# Patient Record
Sex: FEMALE | Race: BLACK OR AFRICAN AMERICAN | NOT HISPANIC OR LATINO | URBAN - METROPOLITAN AREA
[De-identification: names, ages, dates, MRNs, and addresses within clinical notes are randomized per-mention and may not be internally consistent; named-entity substitution may affect disease eponyms.]

---

## 2021-10-28 ENCOUNTER — EMERGENCY (EMERGENCY)
Facility: HOSPITAL | Age: 31
LOS: 0 days | Discharge: HOME | End: 2021-10-28
Attending: STUDENT IN AN ORGANIZED HEALTH CARE EDUCATION/TRAINING PROGRAM
Payer: COMMERCIAL

## 2021-10-28 VITALS
DIASTOLIC BLOOD PRESSURE: 86 MMHG | WEIGHT: 149.91 LBS | OXYGEN SATURATION: 100 % | RESPIRATION RATE: 17 BRPM | SYSTOLIC BLOOD PRESSURE: 139 MMHG | HEIGHT: 59 IN | TEMPERATURE: 98 F | HEART RATE: 77 BPM

## 2021-10-28 VITALS
TEMPERATURE: 98 F | OXYGEN SATURATION: 98 % | HEART RATE: 80 BPM | DIASTOLIC BLOOD PRESSURE: 87 MMHG | SYSTOLIC BLOOD PRESSURE: 119 MMHG | RESPIRATION RATE: 15 BRPM

## 2021-10-28 DIAGNOSIS — M54.2 CERVICALGIA: ICD-10-CM

## 2021-10-28 DIAGNOSIS — M43.6 TORTICOLLIS: ICD-10-CM

## 2021-10-28 PROCEDURE — 93010 ELECTROCARDIOGRAM REPORT: CPT

## 2021-10-28 PROCEDURE — 99284 EMERGENCY DEPT VISIT MOD MDM: CPT

## 2021-10-28 RX ORDER — IBUPROFEN 200 MG
600 TABLET ORAL ONCE
Refills: 0 | Status: COMPLETED | OUTPATIENT
Start: 2021-10-28 | End: 2021-10-28

## 2021-10-28 RX ORDER — METHOCARBAMOL 500 MG/1
750 TABLET, FILM COATED ORAL ONCE
Refills: 0 | Status: COMPLETED | OUTPATIENT
Start: 2021-10-28 | End: 2021-10-28

## 2021-10-28 RX ORDER — LIDOCAINE 4 G/100G
1 CREAM TOPICAL ONCE
Refills: 0 | Status: COMPLETED | OUTPATIENT
Start: 2021-10-28 | End: 2021-10-28

## 2021-10-28 RX ORDER — DIAZEPAM 5 MG
2 TABLET ORAL ONCE
Refills: 0 | Status: DISCONTINUED | OUTPATIENT
Start: 2021-10-28 | End: 2021-10-28

## 2021-10-28 RX ORDER — ACETAMINOPHEN 500 MG
975 TABLET ORAL ONCE
Refills: 0 | Status: COMPLETED | OUTPATIENT
Start: 2021-10-28 | End: 2021-10-28

## 2021-10-28 RX ORDER — CYCLOBENZAPRINE HYDROCHLORIDE 10 MG/1
1 TABLET, FILM COATED ORAL
Qty: 18 | Refills: 0
Start: 2021-10-28 | End: 2021-10-30

## 2021-10-28 RX ADMIN — Medication 2 MILLIGRAM(S): at 05:53

## 2021-10-28 RX ADMIN — LIDOCAINE 1 PATCH: 4 CREAM TOPICAL at 05:53

## 2021-10-28 RX ADMIN — Medication 975 MILLIGRAM(S): at 05:52

## 2021-10-28 RX ADMIN — METHOCARBAMOL 750 MILLIGRAM(S): 500 TABLET, FILM COATED ORAL at 05:53

## 2021-10-28 RX ADMIN — Medication 600 MILLIGRAM(S): at 05:53

## 2021-10-28 NOTE — ED PROVIDER NOTE - PATIENT PORTAL LINK FT
You can access the FollowMyHealth Patient Portal offered by [facility] [place] by registering at the following website: [email or web address]. By joining [facility]’s FollowMyHealth portal, you will also be able to view your health information using other applications (apps) compatible with our system.

## 2021-10-28 NOTE — ED PROVIDER NOTE - OBJECTIVE STATEMENT
31F no pmhx presents to ED for 2-3 days of L neck pain radiating into her L shoulder. States she thinks she may have "slept on it wrong". Reports the pain is 7/10, worse with head turning / movement. Denies sob, chest pain, cough, fevers, dizziness.

## 2021-10-28 NOTE — ED PROVIDER NOTE - NSFOLLOWUPINSTRUCTIONS_ED_ALL_ED_FT
1) Please follow-up with your primary care doctor within the next 3 days.  If you cannot follow-up with your doctor(s), please return to the ED for any urgent issues.  2) If you have any worsening of symptoms or any other concerns please return to the ED immediately.  3) Please continue taking your home medications as directed.  4) You may have been given a copy of your labs and/or imaging.  Please go over these with your primary care doctor.  5) Take 600mg Motrin (also called Advil or Ibuprofen) every 6 hours as needed for fever/pain/discomfort/swelling. You can take this with Tylenol 650 mg (also called acetaminophen) every 6 hours.   Don't use more than 3500mg of Tylenol in any 24-hour period. Make sure your other prescription/over-the-counter medications don't contain any Tylenol so you don't take too much.   If you have any stomach discomfort while taking Motrin, you can use TUMS or Pepcid or Zantac (these can all be bought without a prescription).

## 2021-10-28 NOTE — ED ADULT TRIAGE NOTE - ESI TRIAGE ACUITY LEVEL, MLM
Called pt to confirm he was able to refill all of this medications.  [facility] and all other medications are being delivered by  Specialty pharmacy today per pt's wife Shivani.  BG has been in the high 100's to low 200's per pt.  Pt states he is wearing his lymphedema wraps everyday.    Pt refused [facility] when they called to set up appointment.  Will update MD.  
3

## 2021-10-28 NOTE — ED PROVIDER NOTE - PHYSICAL EXAMINATION
Constitutional: Well appearing, awake, alert, oriented to person, place, time/situation and in no apparent distress.  ENMT: Airway patent.  Eyes: Clear bilaterally, pupils equal, round and reactive to light.  Cardiac: Normal rate, regular rhythm.  Heart sounds S1, S2.  Respiratory: Breath sounds clear and equal bilaterally.  Gastrointestinal: Abdomen soft, non-tender, no guarding.  Neurological: Alert and oriented, no focal deficits, no motor or sensory deficits.  Skin: No evidence of rash.   MSK: pain to palpation of L upper trapezius

## 2021-10-28 NOTE — ED PROVIDER NOTE - CLINICAL SUMMARY MEDICAL DECISION MAKING FREE TEXT BOX
pain seems very MSK. Pt with obvious neck stiffness, pain worsened with neck movement. Will trial pain meds, muscle relaxant, and reassess.

## 2021-10-28 NOTE — ED ADULT NURSE NOTE - OBJECTIVE STATEMENT
Pt alert and oriented present to ed with c/o left neck, shoulder and chest pain and lower back pain x 3 days worse with movement. Pt denies any sob, jaw pain, fever, chills, N/V/D.

## 2022-10-03 ENCOUNTER — ASOB RESULT (OUTPATIENT)
Age: 32
End: 2022-10-03

## 2022-10-03 ENCOUNTER — APPOINTMENT (OUTPATIENT)
Dept: OBGYN | Facility: CLINIC | Age: 32
End: 2022-10-03

## 2022-10-03 VITALS
DIASTOLIC BLOOD PRESSURE: 88 MMHG | HEIGHT: 59 IN | WEIGHT: 150 LBS | SYSTOLIC BLOOD PRESSURE: 132 MMHG | BODY MASS INDEX: 30.24 KG/M2

## 2022-10-03 DIAGNOSIS — N91.2 AMENORRHEA, UNSPECIFIED: ICD-10-CM

## 2022-10-03 DIAGNOSIS — N39.0 URINARY TRACT INFECTION, SITE NOT SPECIFIED: ICD-10-CM

## 2022-10-03 PROCEDURE — 99203 OFFICE O/P NEW LOW 30 MIN: CPT

## 2022-10-03 PROCEDURE — 36415 COLL VENOUS BLD VENIPUNCTURE: CPT

## 2022-10-03 PROCEDURE — 76817 TRANSVAGINAL US OBSTETRIC: CPT

## 2022-10-03 PROCEDURE — 76801 OB US < 14 WKS SINGLE FETUS: CPT

## 2022-10-03 NOTE — PROCEDURE
[Transvaginal OB Sonogram] : Transvaginal OB Sonogram [Intrauterine Pregnancy] : intrauterine pregnancy [Yolk Sac] : yolk sac present [Fetal Heart] : fetal heart present [Date: ___] : Date: [unfilled] [Current GA by Sonogram: ___ (wks)] : Current GA by Sonogram: [unfilled]Uwks [___ day(s)] : [unfilled] days [Transvaginal OB Sonogram WNL] : Transvaginal OB Sonogram WNL [FreeTextEntry1] : 7w5d (gives JOHN 5/17/2023_\par -normal ovaries

## 2022-10-03 NOTE — HISTORY OF PRESENT ILLNESS
[FreeTextEntry1] : 31 y/o  LMP 22 female presents as new patient for amenorrhea visit. Pt had been trying for a while to conceive, highly desired pregnancy. Pt reports irregular menses. Initially menses only 4-5 times a year but recently has had more menses yearly but still irregular. reports bleeding lasting 7 days.  . \par Pt reports nausea, but keeping food down. \par \par GYN: no Gardasil vaccine, hx abnormal pap HPV+ (), h/o HPV s/p colpo and biopsy, no treatment given. \par          had recent pap with Gyn last week-- awaiting results\par PMH/PSH: denies\par Family history: HTN (mother), Colon cancer (MGM recently ) \par Social: Pt works for [employer] from home,  social alcohol, marijuana (stopped with +hpt)\par NKDA\par Meds: pnv\par \par Covid vaccine x2\par Accepts blood products\par no Latex allergies\par no h/o VTE\par

## 2022-10-03 NOTE — END OF VISIT
[FreeTextEntry3] : I, Janie Ramires, acted as a scribe on behalf of Dr. Kyra Whiteside on 10/03/2022 \par \par All medical entries made by the scribe were at my, Dr. Kyra Whiteside, direction and personally dictated by me on 10/03/2022 . I have reviewed the chart and agree that the record accurately reflects my personal performance of the history, physical exam, assessment and plan. I have also personally directed, reviewed, and agreed with the chart\par

## 2022-10-03 NOTE — PLAN
[FreeTextEntry1] : 33 y/o  LMP 22 female presents as new patient for amenorrhea visit. \par \par -TVUS today shows early IUP measuring 7w5d, LMP of 22; Approx JOHN of 23 ;\par -irregular menses, will use sono dating \par - Early pregnancy precautions, practice /hospital info and folder provided to patient. \par -NOB blood panel, GCC cx, UCX, collected and sent at todays office visit. \par -most recent pap last week from outside gyn- awaiting pap results\par -Cont PNVs\par \par First pregnancy\par - advised to start bASA, 81 mg daily, (81mg) starting @ 12 weeks discussed \par -cont pnv\par \par Genetics\par -SEMA4 expanded carrier screen drawn and sent at today's visit \par \par Nausea\par -small frequent meals advised \par -discussed Diclegis use if needed; refused, pt able to tolerate foods and fluids at this time\par \par Immunizations\par -advised flu vaccine \par -advised Covid booster  \par \par RTO 4-5 weeks NOB, NT sono, NIPS \par RTO 8-9 weeks for AFP

## 2022-10-03 NOTE — PHYSICAL EXAM
[Appropriately responsive] : appropriately responsive [Alert] : alert [No Acute Distress] : no acute distress [Clear to Auscultation B/L] : clear to auscultation bilaterally [Soft] : soft [Non-tender] : non-tender [Non-distended] : non-distended [No HSM] : No HSM [No Lesions] : no lesions [No Mass] : no mass [Oriented x3] : oriented x3 [Examination Of The Breasts] : a normal appearance [No Masses] : no breast masses were palpable [Labia Majora] : normal [Labia Minora] : normal [Normal] : normal [Uterine Adnexae] : normal [Enlarged ___ wks] : enlarged [unfilled] ~Uweeks

## 2022-10-06 LAB
25(OH)D3 SERPL-MCNC: 18.5 NG/ML
ABO + RH PNL BLD: NORMAL
BACTERIA UR CULT: NORMAL
BASOPHILS # BLD AUTO: 0.05 K/UL
BASOPHILS NFR BLD AUTO: 0.5 %
BLD GP AB SCN SERPL QL: NORMAL
C TRACH RRNA SPEC QL NAA+PROBE: NOT DETECTED
EOSINOPHIL # BLD AUTO: 0.09 K/UL
EOSINOPHIL NFR BLD AUTO: 1 %
ESTIMATED AVERAGE GLUCOSE: 108 MG/DL
HBA1C MFR BLD HPLC: 5.4 %
HBV SURFACE AG SER QL: NONREACTIVE
HCT VFR BLD CALC: 40 %
HCV AB SER QL: NONREACTIVE
HCV S/CO RATIO: 0.1 S/CO
HGB A MFR BLD: 97.7 %
HGB A2 MFR BLD: 2.3 %
HGB BLD-MCNC: 12.5 G/DL
HGB FRACT BLD-IMP: NORMAL
HIV1+2 AB SPEC QL IA.RAPID: NONREACTIVE
IMM GRANULOCYTES NFR BLD AUTO: 0.3 %
LEAD BLD-MCNC: <1 UG/DL
LYMPHOCYTES # BLD AUTO: 2.26 K/UL
LYMPHOCYTES NFR BLD AUTO: 24.5 %
MAN DIFF?: NORMAL
MCHC RBC-ENTMCNC: 24.8 PG
MCHC RBC-ENTMCNC: 31.3 GM/DL
MCV RBC AUTO: 79.4 FL
MEV IGG FLD QL IA: 161 AU/ML
MEV IGG+IGM SER-IMP: POSITIVE
MONOCYTES # BLD AUTO: 0.64 K/UL
MONOCYTES NFR BLD AUTO: 6.9 %
MUV AB SER-ACNC: POSITIVE
MUV IGG SER QL IA: 142 AU/ML
N GONORRHOEA RRNA SPEC QL NAA+PROBE: NOT DETECTED
NEUTROPHILS # BLD AUTO: 6.16 K/UL
NEUTROPHILS NFR BLD AUTO: 66.8 %
PLATELET # BLD AUTO: 353 K/UL
RBC # BLD: 5.04 M/UL
RBC # FLD: 15.3 %
RUBV IGG FLD-ACNC: 7.2 INDEX
RUBV IGG SER-IMP: POSITIVE
SOURCE AMPLIFICATION: NORMAL
T PALLIDUM AB SER QL IA: NEGATIVE
TSH SERPL-ACNC: 1.02 UIU/ML
VZV AB TITR SER: POSITIVE
VZV IGG SER IF-ACNC: 1725 INDEX
WBC # FLD AUTO: 9.23 K/UL

## 2022-10-10 LAB
B19V IGG SER QL IA: 0.31 INDEX
B19V IGG+IGM SER-IMP: NEGATIVE
B19V IGG+IGM SER-IMP: NORMAL
B19V IGM FLD-ACNC: 0.16 INDEX
B19V IGM SER-ACNC: NEGATIVE

## 2022-10-22 ENCOUNTER — TRANSCRIPTION ENCOUNTER (OUTPATIENT)
Age: 32
End: 2022-10-22

## 2022-11-14 ENCOUNTER — APPOINTMENT (OUTPATIENT)
Dept: OBGYN | Facility: CLINIC | Age: 32
End: 2022-11-14

## 2022-11-14 ENCOUNTER — ASOB RESULT (OUTPATIENT)
Age: 32
End: 2022-11-14

## 2022-11-14 PROCEDURE — 36415 COLL VENOUS BLD VENIPUNCTURE: CPT

## 2022-11-14 PROCEDURE — 76813 OB US NUCHAL MEAS 1 GEST: CPT

## 2022-11-14 PROCEDURE — 76801 OB US < 14 WKS SINGLE FETUS: CPT | Mod: 59

## 2022-11-14 PROCEDURE — 0500F INITIAL PRENATAL CARE VISIT: CPT

## 2022-12-02 ENCOUNTER — NON-APPOINTMENT (OUTPATIENT)
Age: 32
End: 2022-12-02

## 2022-12-12 ENCOUNTER — LABORATORY RESULT (OUTPATIENT)
Age: 32
End: 2022-12-12

## 2022-12-12 ENCOUNTER — APPOINTMENT (OUTPATIENT)
Dept: OBGYN | Facility: CLINIC | Age: 32
End: 2022-12-12

## 2022-12-12 PROCEDURE — 0502F SUBSEQUENT PRENATAL CARE: CPT

## 2022-12-12 PROCEDURE — 36415 COLL VENOUS BLD VENIPUNCTURE: CPT

## 2022-12-13 ENCOUNTER — TRANSCRIPTION ENCOUNTER (OUTPATIENT)
Age: 32
End: 2022-12-13

## 2022-12-14 ENCOUNTER — NON-APPOINTMENT (OUTPATIENT)
Age: 32
End: 2022-12-14

## 2023-01-09 ENCOUNTER — ASOB RESULT (OUTPATIENT)
Age: 33
End: 2023-01-09

## 2023-01-09 ENCOUNTER — APPOINTMENT (OUTPATIENT)
Dept: ANTEPARTUM | Facility: CLINIC | Age: 33
End: 2023-01-09
Payer: COMMERCIAL

## 2023-01-09 PROCEDURE — 76811 OB US DETAILED SNGL FETUS: CPT

## 2023-01-17 ENCOUNTER — NON-APPOINTMENT (OUTPATIENT)
Age: 33
End: 2023-01-17

## 2023-01-17 ENCOUNTER — APPOINTMENT (OUTPATIENT)
Dept: OBGYN | Facility: CLINIC | Age: 33
End: 2023-01-17
Payer: COMMERCIAL

## 2023-01-17 VITALS
HEIGHT: 59 IN | BODY MASS INDEX: 31.25 KG/M2 | WEIGHT: 155 LBS | SYSTOLIC BLOOD PRESSURE: 121 MMHG | DIASTOLIC BLOOD PRESSURE: 83 MMHG

## 2023-01-17 PROCEDURE — 0502F SUBSEQUENT PRENATAL CARE: CPT

## 2023-01-30 ENCOUNTER — EMERGENCY (EMERGENCY)
Facility: HOSPITAL | Age: 33
LOS: 1 days | Discharge: STILL ADMITTED OR ADMINISTRATIVE | End: 2023-01-30
Admitting: EMERGENCY MEDICINE
Payer: COMMERCIAL

## 2023-01-30 ENCOUNTER — INPATIENT (INPATIENT)
Facility: HOSPITAL | Age: 33
LOS: 16 days | Discharge: HOME | End: 2023-02-16
Attending: SPECIALIST | Admitting: SPECIALIST
Payer: COMMERCIAL

## 2023-01-30 ENCOUNTER — NON-APPOINTMENT (OUTPATIENT)
Age: 33
End: 2023-01-30

## 2023-01-30 VITALS
DIASTOLIC BLOOD PRESSURE: 71 MMHG | HEART RATE: 115 BPM | RESPIRATION RATE: 18 BRPM | TEMPERATURE: 99 F | SYSTOLIC BLOOD PRESSURE: 106 MMHG

## 2023-01-30 VITALS
TEMPERATURE: 99 F | SYSTOLIC BLOOD PRESSURE: 134 MMHG | DIASTOLIC BLOOD PRESSURE: 88 MMHG | OXYGEN SATURATION: 99 % | RESPIRATION RATE: 19 BRPM | HEART RATE: 117 BPM

## 2023-01-30 DIAGNOSIS — O26.899 OTHER SPECIFIED PREGNANCY RELATED CONDITIONS, UNSPECIFIED TRIMESTER: ICD-10-CM

## 2023-01-30 LAB
AMNISURE ROM (RUPTURE OF MEMBRANES): POSITIVE
APPEARANCE UR: CLEAR — SIGNIFICANT CHANGE UP
BACTERIA # UR AUTO: NEGATIVE — SIGNIFICANT CHANGE UP
BASOPHILS # BLD AUTO: 0.05 K/UL — SIGNIFICANT CHANGE UP (ref 0–0.2)
BASOPHILS NFR BLD AUTO: 0.4 % — SIGNIFICANT CHANGE UP (ref 0–2)
BILIRUB UR-MCNC: NEGATIVE — SIGNIFICANT CHANGE UP
BLD GP AB SCN SERPL QL: NEGATIVE — SIGNIFICANT CHANGE UP
COLOR SPEC: YELLOW — SIGNIFICANT CHANGE UP
DIFF PNL FLD: NEGATIVE — SIGNIFICANT CHANGE UP
EOSINOPHIL # BLD AUTO: 0.13 K/UL — SIGNIFICANT CHANGE UP (ref 0–0.5)
EOSINOPHIL NFR BLD AUTO: 1.1 % — SIGNIFICANT CHANGE UP (ref 0–6)
EPI CELLS # UR: 6 /HPF — HIGH (ref 0–5)
FIBRONECTIN FETAL SPEC QL: POSITIVE
GLUCOSE UR QL: NEGATIVE — SIGNIFICANT CHANGE UP
HCT VFR BLD CALC: 32.4 % — LOW (ref 34.5–45)
HGB BLD-MCNC: 9.8 G/DL — LOW (ref 11.5–15.5)
HYALINE CASTS # UR AUTO: 5 /LPF — SIGNIFICANT CHANGE UP (ref 0–7)
IANC: 7.65 K/UL — HIGH (ref 1.8–7.4)
IMM GRANULOCYTES NFR BLD AUTO: 0.4 % — SIGNIFICANT CHANGE UP (ref 0–0.9)
KETONES UR-MCNC: ABNORMAL
LEUKOCYTE ESTERASE UR-ACNC: NEGATIVE — SIGNIFICANT CHANGE UP
LYMPHOCYTES # BLD AUTO: 2.56 K/UL — SIGNIFICANT CHANGE UP (ref 1–3.3)
LYMPHOCYTES # BLD AUTO: 22.6 % — SIGNIFICANT CHANGE UP (ref 13–44)
MCHC RBC-ENTMCNC: 23.4 PG — LOW (ref 27–34)
MCHC RBC-ENTMCNC: 30.2 GM/DL — LOW (ref 32–36)
MCV RBC AUTO: 77.5 FL — LOW (ref 80–100)
MONOCYTES # BLD AUTO: 0.89 K/UL — SIGNIFICANT CHANGE UP (ref 0–0.9)
MONOCYTES NFR BLD AUTO: 7.9 % — SIGNIFICANT CHANGE UP (ref 2–14)
NEUTROPHILS # BLD AUTO: 7.65 K/UL — HIGH (ref 1.8–7.4)
NEUTROPHILS NFR BLD AUTO: 67.6 % — SIGNIFICANT CHANGE UP (ref 43–77)
NITRITE UR-MCNC: NEGATIVE — SIGNIFICANT CHANGE UP
NRBC # BLD: 0 /100 WBCS — SIGNIFICANT CHANGE UP (ref 0–0)
NRBC # FLD: 0 K/UL — SIGNIFICANT CHANGE UP (ref 0–0)
PH UR: 6.5 — SIGNIFICANT CHANGE UP (ref 5–8)
PLATELET # BLD AUTO: 363 K/UL — SIGNIFICANT CHANGE UP (ref 150–400)
PROT UR-MCNC: ABNORMAL
RBC # BLD: 4.18 M/UL — SIGNIFICANT CHANGE UP (ref 3.8–5.2)
RBC # FLD: 15.9 % — HIGH (ref 10.3–14.5)
RBC CASTS # UR COMP ASSIST: 8 /HPF — HIGH (ref 0–4)
RH IG SCN BLD-IMP: POSITIVE — SIGNIFICANT CHANGE UP
RH IG SCN BLD-IMP: POSITIVE — SIGNIFICANT CHANGE UP
SP GR SPEC: 1.03 — SIGNIFICANT CHANGE UP (ref 1.01–1.05)
UROBILINOGEN FLD QL: SIGNIFICANT CHANGE UP
WBC # BLD: 11.33 K/UL — HIGH (ref 3.8–10.5)
WBC # FLD AUTO: 11.33 K/UL — HIGH (ref 3.8–10.5)
WBC UR QL: 9 /HPF — HIGH (ref 0–5)

## 2023-01-30 PROCEDURE — L9996: CPT

## 2023-01-30 RX ORDER — OXYTOCIN 10 UNIT/ML
333.33 VIAL (ML) INJECTION
Qty: 20 | Refills: 0 | Status: DISCONTINUED | OUTPATIENT
Start: 2023-01-30 | End: 2023-01-31

## 2023-01-30 RX ORDER — METRONIDAZOLE 500 MG
1 TABLET ORAL
Qty: 0 | Refills: 0 | DISCHARGE

## 2023-01-30 RX ORDER — AMPICILLIN TRIHYDRATE 250 MG
CAPSULE ORAL
Refills: 0 | Status: DISCONTINUED | OUTPATIENT
Start: 2023-01-30 | End: 2023-01-30

## 2023-01-30 RX ORDER — CHLORHEXIDINE GLUCONATE 213 G/1000ML
1 SOLUTION TOPICAL ONCE
Refills: 0 | Status: DISCONTINUED | OUTPATIENT
Start: 2023-01-30 | End: 2023-01-31

## 2023-01-30 RX ORDER — INFLUENZA VIRUS VACCINE 15; 15; 15; 15 UG/.5ML; UG/.5ML; UG/.5ML; UG/.5ML
0.5 SUSPENSION INTRAMUSCULAR ONCE
Refills: 0 | Status: COMPLETED | OUTPATIENT
Start: 2023-01-30 | End: 2023-01-30

## 2023-01-30 RX ORDER — AMPICILLIN TRIHYDRATE 250 MG
2 CAPSULE ORAL ONCE
Refills: 0 | Status: DISCONTINUED | OUTPATIENT
Start: 2023-01-30 | End: 2023-01-30

## 2023-01-30 RX ORDER — SODIUM CHLORIDE 9 MG/ML
1000 INJECTION, SOLUTION INTRAVENOUS
Refills: 0 | Status: DISCONTINUED | OUTPATIENT
Start: 2023-01-30 | End: 2023-01-30

## 2023-01-30 RX ORDER — OXYTOCIN 10 UNIT/ML
333.33 VIAL (ML) INJECTION
Qty: 20 | Refills: 0 | Status: DISCONTINUED | OUTPATIENT
Start: 2023-01-30 | End: 2023-01-30

## 2023-01-30 RX ORDER — CHLORHEXIDINE GLUCONATE 213 G/1000ML
1 SOLUTION TOPICAL ONCE
Refills: 0 | Status: DISCONTINUED | OUTPATIENT
Start: 2023-01-30 | End: 2023-01-30

## 2023-01-30 RX ORDER — AMPICILLIN TRIHYDRATE 250 MG
2 CAPSULE ORAL EVERY 6 HOURS
Refills: 0 | Status: CANCELLED | OUTPATIENT
Start: 2023-01-31 | End: 2023-01-30

## 2023-01-30 RX ORDER — AZITHROMYCIN 500 MG/1
1000 TABLET, FILM COATED ORAL ONCE
Refills: 0 | Status: COMPLETED | OUTPATIENT
Start: 2023-01-30 | End: 2023-01-30

## 2023-01-30 RX ORDER — CITRIC ACID/SODIUM CITRATE 300-500 MG
15 SOLUTION, ORAL ORAL EVERY 6 HOURS
Refills: 0 | Status: DISCONTINUED | OUTPATIENT
Start: 2023-01-30 | End: 2023-01-31

## 2023-01-30 RX ORDER — CITRIC ACID/SODIUM CITRATE 300-500 MG
15 SOLUTION, ORAL ORAL EVERY 6 HOURS
Refills: 0 | Status: DISCONTINUED | OUTPATIENT
Start: 2023-01-30 | End: 2023-01-30

## 2023-01-30 RX ORDER — AMPICILLIN TRIHYDRATE 250 MG
2 CAPSULE ORAL EVERY 6 HOURS
Refills: 0 | Status: COMPLETED | OUTPATIENT
Start: 2023-01-31 | End: 2023-02-01

## 2023-01-30 RX ORDER — AMPICILLIN TRIHYDRATE 250 MG
2 CAPSULE ORAL ONCE
Refills: 0 | Status: COMPLETED | OUTPATIENT
Start: 2023-01-30 | End: 2023-01-30

## 2023-01-30 RX ORDER — SODIUM CHLORIDE 9 MG/ML
1000 INJECTION, SOLUTION INTRAVENOUS
Refills: 0 | Status: DISCONTINUED | OUTPATIENT
Start: 2023-01-30 | End: 2023-01-31

## 2023-01-30 RX ADMIN — AZITHROMYCIN 1000 MILLIGRAM(S): 500 TABLET, FILM COATED ORAL at 21:55

## 2023-01-30 RX ADMIN — Medication 12 MILLIGRAM(S): at 20:29

## 2023-01-30 RX ADMIN — Medication 100 GRAM(S): at 20:30

## 2023-01-30 NOTE — OB PROVIDER TRIAGE NOTE - NSOBPROVIDERNOTE_OBGYN_ALL_OB_FT
31yo  at 24+5 presenting to ob triage for rule out PPROM, she felt a gush of fluid after stepping out of the shower around 4:00pm clear.   Pt. placed on continuous external TOCO  NST-reactive  IV+Labs  VE:0/0%/-3   BPP:8/8 MVP:5.66 CL:4.36  Nitrazine:positive Amnisure: positive,  Ferning equivocal, Speculum: no fluid seen   Pain 0/10 Contractions 0/10  Covid swabbed in triage  Plan: Betamethasone, and antibiotics, pt. to be admitted, seen by MFM for follow-up plan  Pt. denies any additional medical/surgical/psychiatric episodes or history.  Pt. denies decreased fetal movement, visualization of blood, fever, cough, chills, sob, palpitations, n/v.  Discussed with resident-->Dr. Kapadia 33yo  at 24+5 presenting to ob triage for rule out PPROM, she felt a gush of fluid after stepping out of the shower around 4:00pm clear.   Pt. placed on continuous external TOCO  NST-reactive  IV+Labs  VE:0/0%/-3   BPP:8/8 MVP:5.66 CL:4.36  Nitrazine:positive Amnisure: positive,  Ferning equivocal, Speculum: no fluid seen   FFN sent rule out PTL  Pain 0/10 Contractions 0/10  Covid swabbed in triage  Plan: Betamethasone, and antibiotics, pt. to be admitted, seen by MFM for follow-up plan  Pt. denies any additional medical/surgical/psychiatric episodes or history.  Pt. denies decreased fetal movement, visualization of blood, fever, cough, chills, sob, palpitations, n/v.  Discussed with resident-->Dr. Kapadia

## 2023-01-30 NOTE — OB RN PATIENT PROFILE - FUNCTIONAL ASSESSMENT - DAILY ACTIVITY 3.
Problem: Adult Inpatient Plan of Care  Goal: Plan of Care Review  Outcome: Ongoing (interventions implemented as appropriate)  Arrived to unit. No reported side effects from Fe last week. VSS. Tolerated Ferrleict. No reactions noted. Pt has next appt. Pt ambulated off unit, no distress noted.       4 = No assist / stand by assistance

## 2023-01-30 NOTE — OB PROVIDER H&P - ASSESSMENT
33yo  at 24+5 presenting to ob triage for rule out PPROM, she felt a gush of fluid after stepping out of the shower around 4:00pm clear.   Pt. placed on continuous external TOCO  NST- reactive  IV+Labs  VE:0/0%/-3   BPP:8/8 MVP:5.66 CL:4.36  Nitrazine: positive Amnisure: positive,  Ferning equivocal, Speculum: no fluid seen   Pain 0/10 Contractions 0/10  Covid swabbed in triage  Plan: Betamethasone, and antibiotics, pt. to be admitted, seen by MFM for follow-up plan  Pt. denies any additional medical/surgical/psychiatric episodes or history.  Pt. denies decreased fetal movement, visualization of blood, fever, cough, chills, sob, palpitations, n/v.  Discussed with resident-->Dr. Kapadia

## 2023-01-30 NOTE — OB PROVIDER TRIAGE NOTE - HISTORY OF PRESENT ILLNESS
31yo  at 24+5 presenting to ob triage for rule out PPROM, she felt a gush of fluid after stepping out of the shower around 4:00pm clear. Pt. denies decreased fetal movement, visualization of blood, fever, cough, chills, sob, palpitations, n/v.

## 2023-01-30 NOTE — OB RN PATIENT PROFILE - FALL HARM RISK - UNIVERSAL INTERVENTIONS
Bed in lowest position, wheels locked, appropriate side rails in place/Call bell, personal items and telephone in reach/Instruct patient to call for assistance before getting out of bed or chair/Non-slip footwear when patient is out of bed/[place] to call system/Physically safe environment - no spills, clutter or unnecessary equipment/Purposeful Proactive Rounding/Room/bathroom lighting operational, light cord in reach

## 2023-01-30 NOTE — ED ADULT TRIAGE NOTE - CHIEF COMPLAINT QUOTE
patient 24 weeks pregnant , due date 2023. patient c/o leaking clear fluid from vagina that began around 4pm, denies abdominal pain or cramping. patient denies PMH. CASEY Figueroa MD. patient to be seen in L&D per MICHAEL Cordon at [phone].

## 2023-01-30 NOTE — OB RN TRIAGE NOTE - CHIEF COMPLAINT QUOTE
Forms faxed, Mom notified.    Cherri Stokes, [facility]     pprom 1600 clear fluid

## 2023-01-30 NOTE — ED ADULT TRIAGE NOTE - NS ED NURSE NOTE DISPO AOU DETAILS FT
patient to be seen in L&D per MICHAEL Cordon at [phone]. patient transported via wheelchair and EDT to L&D.

## 2023-01-31 ENCOUNTER — APPOINTMENT (OUTPATIENT)
Dept: ANTEPARTUM | Facility: CLINIC | Age: 33
End: 2023-01-31
Payer: COMMERCIAL

## 2023-01-31 ENCOUNTER — ASOB RESULT (OUTPATIENT)
Age: 33
End: 2023-01-31

## 2023-01-31 LAB
C TRACH RRNA SPEC QL NAA+PROBE: SIGNIFICANT CHANGE UP
COVID-19 SPIKE DOMAIN AB INTERP: POSITIVE
COVID-19 SPIKE DOMAIN ANTIBODY RESULT: >250 U/ML — HIGH
N GONORRHOEA RRNA SPEC QL NAA+PROBE: SIGNIFICANT CHANGE UP
SARS-COV-2 IGG+IGM SERPL QL IA: >250 U/ML — HIGH
SARS-COV-2 IGG+IGM SERPL QL IA: POSITIVE
SARS-COV-2 RNA SPEC QL NAA+PROBE: SIGNIFICANT CHANGE UP
T PALLIDUM AB TITR SER: NEGATIVE — SIGNIFICANT CHANGE UP

## 2023-01-31 PROCEDURE — 99232 SBSQ HOSP IP/OBS MODERATE 35: CPT | Mod: GC

## 2023-01-31 PROCEDURE — 76816 OB US FOLLOW-UP PER FETUS: CPT | Mod: 26

## 2023-01-31 RX ORDER — HEPARIN SODIUM 5000 [USP'U]/ML
5000 INJECTION INTRAVENOUS; SUBCUTANEOUS EVERY 12 HOURS
Refills: 0 | Status: DISCONTINUED | OUTPATIENT
Start: 2023-01-31 | End: 2023-02-16

## 2023-01-31 RX ADMIN — HEPARIN SODIUM 5000 UNIT(S): 5000 INJECTION INTRAVENOUS; SUBCUTANEOUS at 21:24

## 2023-01-31 RX ADMIN — Medication 100 GRAM(S): at 13:30

## 2023-01-31 RX ADMIN — Medication 100 GRAM(S): at 08:28

## 2023-01-31 RX ADMIN — Medication 100 GRAM(S): at 02:24

## 2023-01-31 RX ADMIN — Medication 12 MILLIGRAM(S): at 20:34

## 2023-01-31 RX ADMIN — Medication 100 GRAM(S): at 20:33

## 2023-01-31 NOTE — PROGRESS NOTE ADULT - ATTENDING COMMENTS
MFM ATTENDING    Suspected PPROM based on history, with nitrazine / amnisure positive, however fluid remains normal.     Continue latency abx  continue bmz - second dose due tonight  at 8pm.   send ucx/gc/ct/trichomonas    consider re-evaluating once IV course of latency abx completed    discussed implications of PPROM at this GA including stillbirth,  birth,  labor, infection, abruption,  death,  survival with major neurologic morbidity.

## 2023-01-31 NOTE — PROGRESS NOTE ADULT - ASSESSMENT
32 year old  @24/6 presenting after having a gush of clear fluid at 4pm on . As per patient, she had a similar episode 1 week ago. Denies any further leakage.     #PPROM  -Initial exam : + nitrazine, - pooling, +ferning,+ amnisure   -Repeat exam: + nitrazine, - pooling, - ferning , pad dry , no bleeding noted. white discharge in the vault   - c/w latency antibiotics  - betamethasone for fetal lung maturity (-)  - continue to monitor fetal status and for signs of labor or infection  -F/u GC   -Affirm to be performed ( infections can cause changes to pH level)      #Maternal well-being  - Reg diet  - HSQ, SCDs, and ambulation for DVT prophylaxis    #Fetal well being   - NST BID  - ATU sono  today      Bree Mendez, PGY3 32 year old  @24/6 presenting after having a gush of clear fluid at 4pm on . As per patient, she had a similar episode 1 week ago. Denies any further leakage.     #PPROM  -Initial exam : + nitrazine, - pooling, +ferning,+ amnisure   -Repeat exam: + nitrazine, - pooling, - ferning , pad dry , no bleeding noted. white discharge in the vault   - c/w latency antibiotics  - betamethasone for fetal lung maturity (-)  - continue to monitor fetal status and for signs of labor or infection  -F/u urine GC   -Consider Affirm  ( infections can cause changes to pH level)   -Consider repeating Amnisure in a few days       #Maternal well-being  - Reg diet  - HSQ, SCDs, and ambulation for DVT prophylaxis    #Fetal well being   - NST BID  - ATU sono  today      Bree Mendez, PGY3

## 2023-01-31 NOTE — PROGRESS NOTE ADULT - SUBJECTIVE AND OBJECTIVE BOX
R3 Antepartum Note,    Patient seen and examined at bedside, no acute overnight events. No acute complaints. Pt reports +FM, denies LOF, VB, ctx, HA, epigastric pain, blurred vision, CP, SOB, N/V, fevers, and chills.    Vital Signs Last 24 Hours  T(C): 36.8 (01-31-23 @ 04:30), Max: 37.1 (01-30-23 @ 17:35)  HR: 101 (01-31-23 @ 02:30) (93 - 117)  BP: 118/72 (01-31-23 @ 02:30) (101/63 - 134/88)  RR: 15 (01-31-23 @ 02:30) (15 - 19)  SpO2: 99% (01-30-23 @ 16:43) (99% - 99%)    CAPILLARY BLOOD GLUCOSE          Physical Exam:  General: NAD  Abdomen: Soft, non-tender, gravid  Ext: No pain or swelling    NST reactive overnight    Labs:             9.8    11.33 )-----------( 363      ( 01-30 @ 22:15 )             32.4                   MEDICATIONS  (STANDING):  ampicillin  IVPB 2 Gram(s) IV Intermittent every 6 hours  betamethasone Injectable 12 milliGRAM(s) IntraMuscular every 24 hours  chlorhexidine 2% Cloths 1 Application(s) Topical once  citric acid/sodium citrate Solution 15 milliLiter(s) Oral every 6 hours  influenza   Vaccine 0.5 milliLiter(s) IntraMuscular once  lactated ringers. 1000 milliLiter(s) (125 mL/Hr) IV Continuous <Continuous>  oxytocin Infusion 333.333 milliUNIT(s)/Min (1000 mL/Hr) IV Continuous <Continuous>    MEDICATIONS  (PRN):   R3 Antepartum Note,    Patient seen and examined at bedside, no acute overnight events. No acute complaints. Pt reports +FM, denies LOF, VB, ctx, HA, epigastric pain, blurred vision, CP, SOB, N/V, fevers, and chills.    Vital Signs Last 24 Hours  T(C): 36.8 (01-31-23 @ 04:30), Max: 37.1 (01-30-23 @ 17:35)  HR: 101 (01-31-23 @ 02:30) (93 - 117)  BP: 118/72 (01-31-23 @ 02:30) (101/63 - 134/88)  RR: 15 (01-31-23 @ 02:30) (15 - 19)  SpO2: 99% (01-30-23 @ 16:43) (99% - 99%)    CAPILLARY BLOOD GLUCOSE          Physical Exam:  General: NAD  Abdomen: Soft, non-tender, gravid  Maternity pad dry   Nitrazine +, pooling, negative, ferning negative   Ext: No pain or swelling    NST reactive overnight    Labs:             9.8    11.33 )-----------( 363      ( 01-30 @ 22:15 )             32.4                   MEDICATIONS  (STANDING):  ampicillin  IVPB 2 Gram(s) IV Intermittent every 6 hours  betamethasone Injectable 12 milliGRAM(s) IntraMuscular every 24 hours  chlorhexidine 2% Cloths 1 Application(s) Topical once  citric acid/sodium citrate Solution 15 milliLiter(s) Oral every 6 hours  influenza   Vaccine 0.5 milliLiter(s) IntraMuscular once  lactated ringers. 1000 milliLiter(s) (125 mL/Hr) IV Continuous <Continuous>  oxytocin Infusion 333.333 milliUNIT(s)/Min (1000 mL/Hr) IV Continuous <Continuous>    MEDICATIONS  (PRN):

## 2023-02-01 PROBLEM — B97.7 PAPILLOMAVIRUS AS THE CAUSE OF DISEASES CLASSIFIED ELSEWHERE: Chronic | Status: ACTIVE | Noted: 2023-01-30

## 2023-02-01 LAB
BASOPHILS # BLD AUTO: 0.01 K/UL — SIGNIFICANT CHANGE UP (ref 0–0.2)
BASOPHILS NFR BLD AUTO: 0.1 % — SIGNIFICANT CHANGE UP (ref 0–2)
CULTURE RESULTS: SIGNIFICANT CHANGE UP
EOSINOPHIL # BLD AUTO: 0 K/UL — SIGNIFICANT CHANGE UP (ref 0–0.5)
EOSINOPHIL NFR BLD AUTO: 0 % — SIGNIFICANT CHANGE UP (ref 0–6)
HCT VFR BLD CALC: 26.9 % — LOW (ref 34.5–45)
HGB BLD-MCNC: 8.6 G/DL — LOW (ref 11.5–15.5)
IANC: 8.67 K/UL — HIGH (ref 1.8–7.4)
IMM GRANULOCYTES NFR BLD AUTO: 1.2 % — HIGH (ref 0–0.9)
LYMPHOCYTES # BLD AUTO: 1.49 K/UL — SIGNIFICANT CHANGE UP (ref 1–3.3)
LYMPHOCYTES # BLD AUTO: 14 % — SIGNIFICANT CHANGE UP (ref 13–44)
MCHC RBC-ENTMCNC: 23.8 PG — LOW (ref 27–34)
MCHC RBC-ENTMCNC: 32 GM/DL — SIGNIFICANT CHANGE UP (ref 32–36)
MCV RBC AUTO: 74.3 FL — LOW (ref 80–100)
MONOCYTES # BLD AUTO: 0.37 K/UL — SIGNIFICANT CHANGE UP (ref 0–0.9)
MONOCYTES NFR BLD AUTO: 3.5 % — SIGNIFICANT CHANGE UP (ref 2–14)
NEUTROPHILS # BLD AUTO: 8.67 K/UL — HIGH (ref 1.8–7.4)
NEUTROPHILS NFR BLD AUTO: 81.2 % — HIGH (ref 43–77)
NRBC # BLD: 0 /100 WBCS — SIGNIFICANT CHANGE UP (ref 0–0)
NRBC # FLD: 0 K/UL — SIGNIFICANT CHANGE UP (ref 0–0)
PLATELET # BLD AUTO: 340 K/UL — SIGNIFICANT CHANGE UP (ref 150–400)
RBC # BLD: 3.62 M/UL — LOW (ref 3.8–5.2)
RBC # FLD: 15.6 % — HIGH (ref 10.3–14.5)
SPECIMEN SOURCE: SIGNIFICANT CHANGE UP
SPECIMEN SOURCE: SIGNIFICANT CHANGE UP
T VAGINALIS RRNA SPEC QL NAA+PROBE: SIGNIFICANT CHANGE UP
WBC # BLD: 10.67 K/UL — HIGH (ref 3.8–10.5)
WBC # FLD AUTO: 10.67 K/UL — HIGH (ref 3.8–10.5)

## 2023-02-01 PROCEDURE — 99231 SBSQ HOSP IP/OBS SF/LOW 25: CPT

## 2023-02-01 RX ORDER — FERROUS SULFATE 325(65) MG
325 TABLET ORAL DAILY
Refills: 0 | Status: DISCONTINUED | OUTPATIENT
Start: 2023-02-01 | End: 2023-02-03

## 2023-02-01 RX ORDER — AMOXICILLIN 250 MG/5ML
500 SUSPENSION, RECONSTITUTED, ORAL (ML) ORAL EVERY 8 HOURS
Refills: 0 | Status: COMPLETED | OUTPATIENT
Start: 2023-02-01

## 2023-02-01 RX ORDER — AMOXICILLIN 250 MG/5ML
500 SUSPENSION, RECONSTITUTED, ORAL (ML) ORAL EVERY 8 HOURS
Refills: 0 | Status: COMPLETED | OUTPATIENT
Start: 2023-02-01 | End: 2023-02-06

## 2023-02-01 RX ADMIN — Medication 1 TABLET(S): at 09:53

## 2023-02-01 RX ADMIN — Medication 200 GRAM(S): at 15:24

## 2023-02-01 RX ADMIN — Medication 500 MILLIGRAM(S): at 21:19

## 2023-02-01 RX ADMIN — HEPARIN SODIUM 5000 UNIT(S): 5000 INJECTION INTRAVENOUS; SUBCUTANEOUS at 09:45

## 2023-02-01 RX ADMIN — Medication 100 GRAM(S): at 02:20

## 2023-02-01 RX ADMIN — Medication 100 GRAM(S): at 09:43

## 2023-02-01 RX ADMIN — Medication 325 MILLIGRAM(S): at 21:19

## 2023-02-01 NOTE — PROGRESS NOTE ADULT - ASSESSMENT
32 year old  @25wk admitted for concerns for PPROM@24wk+5d. Maternal and fetal status reassuring.     #PPROM  -Initial exam : + nitrazine, - pooling, +ferning,+ amnisure   -Repeat exam: + nitrazine, - pooling, - ferning , pad dry , no bleeding noted. white discharge in the vault   - c/w latency antibiotics  - s/p betamethasone for fetal lung maturity (-)  - continue to monitor fetal status and for signs of labor or infection  -GC(): neg   -Consider repeating Amnisure in a few days       #Maternal well-being  - Reg diet  - HSQ, SCDs, and ambulation for DVT prophylaxis    #Fetal well being   - NST BID    Davis PGY3

## 2023-02-01 NOTE — PROGRESS NOTE ADULT - ATTENDING COMMENTS
Patient seen and examined   She has no complains of bleeding , pain, fever, contraction  Abdomen soft and non tender  Discussed with patient plan of care with the aim of prolonging pregnancy to 34-36 weeks  Continue latency antibiotics  Steroids  Will follow

## 2023-02-01 NOTE — PROGRESS NOTE ADULT - SUBJECTIVE AND OBJECTIVE BOX
R3 Antepartum Note, HD#3    Patient seen and examined at bedside, no acute overnight events. No acute complaints. Pt reports +FM, denies VB, ctx, HA, epigastric pain, blurred vision, CP, SOB, N/V, fevers, and chills.    Vital Signs Last 24 Hours  T(C): 36.8 (02-01-23 @ 06:27), Max: 37.1 (01-31-23 @ 13:51)  HR: 106 (02-01-23 @ 06:27) (98 - 112)  BP: 103/59 (02-01-23 @ 06:27) (94/50 - 128/87)  RR: 20 (02-01-23 @ 06:27) (16 - 20)  SpO2: 97% (02-01-23 @ 06:27) (94% - 99%)    CAPILLARY BLOOD GLUCOSE          Physical Exam:  General: NAD  Abdomen: Soft, non-tender, gravid  Ext: No pain or swelling    NST reactive overnight    Labs:             8.6    10.67 )-----------( 340      ( 02-01 @ 05:38 )             26.9                   MEDICATIONS  (STANDING):  ampicillin  IVPB 2 Gram(s) IV Intermittent every 6 hours  heparin   Injectable 5000 Unit(s) SubCutaneous every 12 hours  influenza   Vaccine 0.5 milliLiter(s) IntraMuscular once    MEDICATIONS  (PRN):

## 2023-02-02 ENCOUNTER — NON-APPOINTMENT (OUTPATIENT)
Age: 33
End: 2023-02-02

## 2023-02-02 ENCOUNTER — TRANSCRIPTION ENCOUNTER (OUTPATIENT)
Age: 33
End: 2023-02-02

## 2023-02-02 ENCOUNTER — APPOINTMENT (OUTPATIENT)
Dept: ANTEPARTUM | Facility: CLINIC | Age: 33
End: 2023-02-02
Payer: COMMERCIAL

## 2023-02-02 ENCOUNTER — ASOB RESULT (OUTPATIENT)
Age: 33
End: 2023-02-02

## 2023-02-02 LAB
BLD GP AB SCN SERPL QL: NEGATIVE — SIGNIFICANT CHANGE UP
FERRITIN SERPL-MCNC: 7 NG/ML — LOW (ref 15–150)
FOLATE SERPL-MCNC: 13.1 NG/ML — SIGNIFICANT CHANGE UP (ref 3.1–17.5)
IRON SATN MFR SERPL: 37 UG/DL — SIGNIFICANT CHANGE UP (ref 30–160)
IRON SATN MFR SERPL: 7 % — LOW (ref 14–50)
RH IG SCN BLD-IMP: POSITIVE — SIGNIFICANT CHANGE UP
TIBC SERPL-MCNC: 502 UG/DL — HIGH (ref 220–430)
UIBC SERPL-MCNC: 465 UG/DL — HIGH (ref 110–370)

## 2023-02-02 PROCEDURE — 76819 FETAL BIOPHYS PROFIL W/O NST: CPT | Mod: 26

## 2023-02-02 PROCEDURE — 99232 SBSQ HOSP IP/OBS MODERATE 35: CPT | Mod: GC

## 2023-02-02 RX ORDER — SODIUM CHLORIDE 9 MG/ML
1000 INJECTION, SOLUTION INTRAVENOUS ONCE
Refills: 0 | Status: COMPLETED | OUTPATIENT
Start: 2023-02-02 | End: 2023-02-02

## 2023-02-02 RX ORDER — AMOXICILLIN 250 MG/5ML
1 SUSPENSION, RECONSTITUTED, ORAL (ML) ORAL
Qty: 0 | Refills: 0 | DISCHARGE
Start: 2023-02-02

## 2023-02-02 RX ORDER — FERROUS SULFATE 325(65) MG
1 TABLET ORAL
Qty: 0 | Refills: 0 | DISCHARGE
Start: 2023-02-02

## 2023-02-02 RX ORDER — SODIUM CHLORIDE 9 MG/ML
1000 INJECTION, SOLUTION INTRAVENOUS
Refills: 0 | Status: DISCONTINUED | OUTPATIENT
Start: 2023-02-02 | End: 2023-02-02

## 2023-02-02 RX ADMIN — Medication 325 MILLIGRAM(S): at 13:19

## 2023-02-02 RX ADMIN — Medication 1 TABLET(S): at 13:19

## 2023-02-02 RX ADMIN — HEPARIN SODIUM 5000 UNIT(S): 5000 INJECTION INTRAVENOUS; SUBCUTANEOUS at 22:36

## 2023-02-02 RX ADMIN — Medication 500 MILLIGRAM(S): at 13:20

## 2023-02-02 RX ADMIN — SODIUM CHLORIDE 2000 MILLILITER(S): 9 INJECTION, SOLUTION INTRAVENOUS at 21:10

## 2023-02-02 RX ADMIN — Medication 500 MILLIGRAM(S): at 05:48

## 2023-02-02 RX ADMIN — Medication 500 MILLIGRAM(S): at 21:09

## 2023-02-02 NOTE — DISCHARGE NOTE ANTEPARTUM - CARE PLAN
Principal Discharge DX:	History of  premature rupture of membranes (PPROM)  Assessment and plan of treatment:	32 year old  @25wk+1d admitted for concerns for PPROM@24wk+5d. SSE: + nitrazine, - pooling, +ferning,+ amnisure; Pt and her attendings at [facility] wish for her to transfer care to [facility]. On transfer recommend c/w latency antibiotics, continue to monitor fetal status and for signs of labor or infection. Pt is s/p betamethasone for fetal lung maturity (-).  GC(): neg. GBS sent and pending result.     Dr. Ruano MFM and Dr. Khan OB attending at [facility] aware of patient and above.   1 Principal Discharge DX:	Premature rupture of membranes, resealed  Assessment and plan of treatment:	- Follow up with you OB within one week of discharge  - Return to hospital with any leaking fluid, contractions, vaginal bleeding, or decreased fetal movement

## 2023-02-02 NOTE — PROGRESS NOTE ADULT - SUBJECTIVE AND OBJECTIVE BOX
R3 Antepartum Note, HD#4    Patient seen and examined at bedside, no acute overnight events. No acute complaints. Reports last episode of LOF the evening of the 1/31. No additional episode since then. Pt reports +FM, VB, ctx, HA, epigastric pain, blurred vision, CP, SOB, N/V, fevers, and chills.    Vital Signs Last 24 Hours  T(C): 36.6 (02-02-23 @ 05:38), Max: 37.1 (02-01-23 @ 14:02)  HR: 98 (02-02-23 @ 05:38) (86 - 116)  BP: 106/59 (02-02-23 @ 05:38) (89/56 - 109/62)  RR: 17 (02-02-23 @ 05:38) (17 - 18)  SpO2: 98% (02-02-23 @ 05:38) (95% - 99%)    CAPILLARY BLOOD GLUCOSE          Physical Exam:  General: NAD  Abdomen: Soft, non-tender, gravid  Ext: No pain or swelling    NST reactive overnight    Labs:             8.6    10.67 )-----------( 340      ( 02-01 @ 05:38 )             26.9                   MEDICATIONS  (STANDING):  amoxicillin 500 milliGRAM(s) Oral every 8 hours  ferrous    sulfate 325 milliGRAM(s) Oral daily  heparin   Injectable 5000 Unit(s) SubCutaneous every 12 hours  influenza   Vaccine 0.5 milliLiter(s) IntraMuscular once  prenatal multivitamin 1 Tablet(s) Oral daily    MEDICATIONS  (PRN):

## 2023-02-02 NOTE — DISCHARGE NOTE ANTEPARTUM - CARE PROVIDER_API CALL
Nighat Khan)  Obstetrics and Gynecology  [address] [place] [address], [address]  [place] [address]  Phone: [phone]  Fax: [phone]  Follow Up Time:

## 2023-02-02 NOTE — DISCHARGE NOTE ANTEPARTUM - PATIENT PORTAL LINK FT
You can access the FollowMyHealth Patient Portal offered by [facility] by registering at the following website: [email or web address]. By joining [facility]’s FollowMyHealth portal, you will also be able to view your health information using other applications (apps) compatible with our system.

## 2023-02-02 NOTE — PROGRESS NOTE ADULT - ATTENDING COMMENTS
MFM ATTENDING NOTE    Since admission, patient has had additional gushes of fluid, supporting diagnosis of PPROM.   s/p acs 1/30-31  not yet received mag neuroprotection.     routine ap care  continue latency abx  attempt to transfer to [facility] since her providers are there however they are on diversion. Will re-address tomorrow. MFM ATTENDING NOTE    Since admission, patient has had additional gushes of fluid, supporting diagnosis of PPROM.   s/p acs 1/30-31  not yet received mag neuroprotection.   asymptomatic  low suspicion for chorio/abruption    routine ap care  continue latency abx  attempt to transfer to [facility] since her providers are there however they are on diversion. Will re-address tomorrow.

## 2023-02-02 NOTE — DISCHARGE NOTE ANTEPARTUM - MEDICATION SUMMARY - MEDICATIONS TO TAKE
I will START or STAY ON the medications listed below when I get home from the hospital:    ferrous sulfate 325 mg (65 mg elemental iron) oral tablet  -- 1 tab(s) by mouth once a day  -- Indication: For Anemia    Prenatal Multivitamins with Folic Acid 1 mg oral tablet  -- 1 tab(s) by mouth once a day  -- Indication: For Pregnancy    amoxicillin 500 mg oral capsule  -- 1 cap(s) by mouth every 8 hours  -- Indication: For PPROM   I will START or STAY ON the medications listed below when I get home from the hospital:    metroNIDAZOLE 500 mg oral tablet  -- 1 tab(s) by mouth 2 times a day  -- Indication: For BV    ferrous sulfate 325 mg (65 mg elemental iron) oral tablet  -- 1 tab(s) by mouth once a day  -- Indication: For Anemia    Prenatal Multivitamins with Folic Acid 1 mg oral tablet  -- 1 tab(s) by mouth once a day  -- Indication: For Pregnancy    ascorbic acid 500 mg oral tablet  -- 1 tab(s) by mouth once a day  -- Indication: For Anemia

## 2023-02-02 NOTE — DISCHARGE NOTE ANTEPARTUM - EAT A WELL BALANCED DIET INCLUDING PROTEINS (LEAN MEATS, POULTRY, FISH AND BEANS), FRESH FRUIT OR JUICE, FRESH VEGETABLES, AND DAIRY PRODUCTS
pt brought in by mom, was at her aunts house and hugged dog too tight and dog bit pts right periauricular area.
Statement Selected

## 2023-02-02 NOTE — DISCHARGE NOTE ANTEPARTUM - NS MD DC FALL RISK RISK
For information on Fall & Injury Prevention, visit: [email or web address] OR  [email or web address] OR  https://www.cdc.gov/steadi/patient.html

## 2023-02-02 NOTE — DISCHARGE NOTE ANTEPARTUM - PLAN OF CARE
32 year old  @25wk+1d admitted for concerns for PPROM@24wk+5d. SSE: + nitrazine, - pooling, +ferning,+ amnisure; Pt and her attendings at [facility] wish for her to transfer care to [facility]. On transfer recommend c/w latency antibiotics, continue to monitor fetal status and for signs of labor or infection. Pt is s/p betamethasone for fetal lung maturity (-).  GC(): neg. GBS sent and pending result.     Dr. Ruano MFM and Dr. Khan OB attending at [facility] aware of patient and above. - Follow up with you OB within one week of discharge  - Return to hospital with any leaking fluid, contractions, vaginal bleeding, or decreased fetal movement

## 2023-02-02 NOTE — PROGRESS NOTE ADULT - ASSESSMENT
32 year old  @25wk+1d admitted for concerns for PPROM@24wk+5d. Maternal and fetal status reassuring.     #PPROM  -Initial exam : + nitrazine, - pooling, +ferning,+ amnisure   -Repeat exam: + nitrazine, - pooling, - ferning , pad dry , no bleeding noted. white discharge in the vault   - c/w latency antibiotics  - s/p betamethasone for fetal lung maturity (-)  - continue to monitor fetal status and for signs of labor or infection  -GC(): neg   -Consider repeating Amnisure in a few days       #Maternal well-being  - Reg diet  - HSQ, SCDs, and ambulation for DVT prophylaxis    #Fetal well being   - NST BID    Davis PGY3

## 2023-02-02 NOTE — DISCHARGE NOTE ANTEPARTUM - HOSPITAL COURSE
32 year old  @25wk+1d admitted for concerns for PPROM@24wk+5d. SSE: + nitrazine, - pooling, +ferning,+ amnisure; Pt and her attendings at [facility] wish for her to transfer care to [facility]. On transfer recommend c/w latency antibiotics, continue to monitor fetal status and for signs of labor or infection. Pt is s/p betamethasone for fetal lung maturity (-).  GC(): neg. GBS sent and pending result.     Dr. Ruano MFM and Dr. Khan OB attending at [facility] aware of patient and above. 32 year old  now at 27.1 weeks admitted for concerns for PPROM@24wk+5d. SSE: + nitrazine, - pooling, +ferning,+ amnisure; Pt is s/p betamethasone for fetal lung maturity (-).  GC(): neg.32 year old  @27w1d admitted for PPROM@24w6d. Initial exam : + nitrazine, - pooling, +ferning,+ amnisure;- Repeat exam: + nitrazine, - pooling, - ferning , pad dry , no bleeding noted. white discharge in the vault; -Repeat exam(2/15): -nitrazine, -pooling. -ferning. -amnisure . Exam(): nitrazine, -pooling. -ferning. -amnisure.  Affirm +BV and Candida; started on 7 days of flagyl ( started).  s/p latency antibiotics/ s/p betamethasone for fetal lung maturity (-). GC(): neg. GBS negative ().  Growth scan(): vtx, 866g(84%). ATU sonogram : SJ 18 Vtx presentation BPP 8/8. NSTs have been reactive. - s/p Tdap 2/10. GCT elevated with GTT neg.  Patient denies any leaking fluid in over a week. She denies VB/Contx. Endorses +FM. Stable for discharge home with strict PTL precautions and return precautions and to follow up with her OB within one week.

## 2023-02-03 PROCEDURE — 99232 SBSQ HOSP IP/OBS MODERATE 35: CPT | Mod: GC

## 2023-02-03 RX ORDER — FERROUS SULFATE 325(65) MG
325 TABLET ORAL THREE TIMES A DAY
Refills: 0 | Status: DISCONTINUED | OUTPATIENT
Start: 2023-02-03 | End: 2023-02-16

## 2023-02-03 RX ORDER — ASCORBIC ACID 60 MG
500 TABLET,CHEWABLE ORAL DAILY
Refills: 0 | Status: DISCONTINUED | OUTPATIENT
Start: 2023-02-03 | End: 2023-02-16

## 2023-02-03 RX ORDER — MAGNESIUM HYDROXIDE 400 MG/1
30 TABLET, CHEWABLE ORAL DAILY
Refills: 0 | Status: DISCONTINUED | OUTPATIENT
Start: 2023-02-03 | End: 2023-02-16

## 2023-02-03 RX ADMIN — Medication 500 MILLIGRAM(S): at 21:09

## 2023-02-03 RX ADMIN — Medication 1 TABLET(S): at 09:47

## 2023-02-03 RX ADMIN — Medication 500 MILLIGRAM(S): at 13:13

## 2023-02-03 RX ADMIN — HEPARIN SODIUM 5000 UNIT(S): 5000 INJECTION INTRAVENOUS; SUBCUTANEOUS at 09:47

## 2023-02-03 RX ADMIN — HEPARIN SODIUM 5000 UNIT(S): 5000 INJECTION INTRAVENOUS; SUBCUTANEOUS at 21:10

## 2023-02-03 RX ADMIN — Medication 325 MILLIGRAM(S): at 13:13

## 2023-02-03 RX ADMIN — Medication 325 MILLIGRAM(S): at 21:09

## 2023-02-03 RX ADMIN — Medication 325 MILLIGRAM(S): at 09:47

## 2023-02-03 RX ADMIN — Medication 500 MILLIGRAM(S): at 06:04

## 2023-02-03 NOTE — PROGRESS NOTE ADULT - NSPROGADDITIONALINFOA_GEN_ALL_CORE
MFM Fellow Addendum:    Briefly, patient is a 31 YO  at 25w2d admitted with PPROM. Patient without concerns this morning - denies contractions, leakage of purulent/malodorous fluid, or vaginal bleeding and reports normal fetal movement. Patient afebrile and non-tachycardic. Fetal heart monitoring reactive and reassuring for gestational age (baseline 150, moderate variability, positive accelerations, no decelerations) and tocometry without contractions. Low concern for  labor, intraamniotic infection or placental abruption at this time. Patient s/p BMZ -. GBS in process. Continues on latency antibiotics. Continue with expectant management of PPROM.    Vianney Moore MD  M Fellow  Attg: Dr. Chowdary

## 2023-02-03 NOTE — PROGRESS NOTE ADULT - ASSESSMENT
32 year old  @25wk+2d admitted for concerns for PPROM@24wk+5d. Maternal and fetal status reassuring.     #PPROM  -Initial exam : + nitrazine, - pooling, +ferning,+ amnisure   -Repeat exam: + nitrazine, - pooling, - ferning , pad dry , no bleeding noted. white discharge in the vault   - c/w latency antibiotics  - s/p betamethasone for fetal lung maturity (-)  - continue to monitor fetal status and for signs of labor or infection  -GC(): neg   -Consider repeating Amnisure in a few days       #Maternal well-being  - Reg diet  - HSQ, SCDs, and ambulation for DVT prophylaxis    #Fetal well being   - NST BID    Davis PGY3 32 year old  @25wk+2d admitted for concerns for PPROM@24wk+5d. Maternal and fetal status reassuring.     #PPROM  -Initial exam : + nitrazine, - pooling, +ferning,+ amnisure   -Repeat exam: + nitrazine, - pooling, - ferning , pad dry , no bleeding noted. white discharge in the vault   - c/w latency antibiotics  - s/p betamethasone for fetal lung maturity (-)  - continue to monitor fetal status and for signs of labor or infection  -GC(): neg   -Consider repeating Amnisure in a few days       #Fetal well being   - NST BID    #Maternal well-being  - Reg diet  - HSQ, SCDs, and ambulation for DVT prophylaxis        Davis PGY3

## 2023-02-03 NOTE — PROGRESS NOTE ADULT - SUBJECTIVE AND OBJECTIVE BOX
R3 Antepartum Note, HD#5    Patient seen and examined at bedside, no acute overnight events. No acute complaints. Pt reports +FM, denies LOF, VB, ctx, HA, epigastric pain, blurred vision, CP, SOB, N/V, fevers, and chills.    Vital Signs Last 24 Hours  T(C): 37.1 (02-03-23 @ 05:30), Max: 37.1 (02-02-23 @ 20:51)  HR: 91 (02-03-23 @ 05:30) (82 - 99)  BP: 113/53 (02-03-23 @ 05:30) (106/71 - 113/67)  RR: 18 (02-03-23 @ 05:30) (16 - 18)  SpO2: 100% (02-03-23 @ 05:30) (95% - 100%)    CAPILLARY BLOOD GLUCOSE          Physical Exam:  General: NAD  Abdomen: Soft, non-tender, gravid  Ext: No pain or swelling    NST reactive overnight    Labs:                MEDICATIONS  (STANDING):  amoxicillin 500 milliGRAM(s) Oral every 8 hours  ferrous    sulfate 325 milliGRAM(s) Oral daily  heparin   Injectable 5000 Unit(s) SubCutaneous every 12 hours  influenza   Vaccine 0.5 milliLiter(s) IntraMuscular once  prenatal multivitamin 1 Tablet(s) Oral daily    MEDICATIONS  (PRN):

## 2023-02-03 NOTE — PROGRESS NOTE ADULT - ATTENDING COMMENTS
MFM ATTENDING NOTE    pprom  stable  fetal status reassuring thus far  low suspicion for abruption, chorio, ptl at this time  declining transfer to [facility]  continue latency abx  routine ap care  s/p ACS 1/30-31.   eligible for rescue on 2/13 if indicated.   continue current management

## 2023-02-04 LAB
CULTURE RESULTS: SIGNIFICANT CHANGE UP
SPECIMEN SOURCE: SIGNIFICANT CHANGE UP

## 2023-02-04 PROCEDURE — 99232 SBSQ HOSP IP/OBS MODERATE 35: CPT | Mod: GC

## 2023-02-04 RX ADMIN — HEPARIN SODIUM 5000 UNIT(S): 5000 INJECTION INTRAVENOUS; SUBCUTANEOUS at 22:09

## 2023-02-04 RX ADMIN — HEPARIN SODIUM 5000 UNIT(S): 5000 INJECTION INTRAVENOUS; SUBCUTANEOUS at 11:01

## 2023-02-04 RX ADMIN — Medication 500 MILLIGRAM(S): at 05:14

## 2023-02-04 RX ADMIN — Medication 325 MILLIGRAM(S): at 22:09

## 2023-02-04 RX ADMIN — Medication 1 TABLET(S): at 11:01

## 2023-02-04 RX ADMIN — Medication 500 MILLIGRAM(S): at 14:36

## 2023-02-04 RX ADMIN — Medication 325 MILLIGRAM(S): at 14:36

## 2023-02-04 RX ADMIN — Medication 500 MILLIGRAM(S): at 22:09

## 2023-02-04 RX ADMIN — Medication 325 MILLIGRAM(S): at 05:14

## 2023-02-04 NOTE — PROGRESS NOTE ADULT - ATTENDING COMMENTS
MFM ATTENDING NOTE    25w3d  pprom  stable  fetal status reassuring thus far  low suspicion for abruption, chorio, ptl at this time  declining transfer to [facility]  continue latency abx  routine ap care  s/p ACS 1/30-31.   eligible for rescue on 2/13 if indicated.   continue current management .

## 2023-02-04 NOTE — PROGRESS NOTE ADULT - ASSESSMENT
32 year old  @25wk+3d admitted for concerns for PPROM@24wk+5d. Maternal and fetal status reassuring.     #PPROM  -Initial exam : + nitrazine, - pooling, +ferning,+ amnisure   -Repeat exam: + nitrazine, - pooling, - ferning , pad dry , no bleeding noted. white discharge in the vault   - c/w latency antibiotics  - s/p betamethasone for fetal lung maturity (-)  - continue to monitor fetal status and for signs of labor or infection  -GC(): neg       #Vaginal itching  -No discharge seen on evaluation  -Consider treating for yeast infection vs BV    -Patient with constant usage of pads since admission    #Fetal well being   - NST BID    #Maternal well-being  - Reg diet  - HSQ, SCDs, and ambulation for DVT prophylaxis     Bree Mendez, PGY3

## 2023-02-04 NOTE — PROGRESS NOTE ADULT - SUBJECTIVE AND OBJECTIVE BOX
R3 Antepartum Note    Patient seen and examined at bedside, no acute overnight events. No acute complaints. Pt reports +FM, denies LOF, VB, ctx, HA, epigastric pain, blurred vision, CP, SOB, N/V, fevers, and chills.    Vital Signs Last 24 Hours  T(C): 36.6 (02-04-23 @ 06:09), Max: 37.1 (02-03-23 @ 21:52)  HR: 89 (02-04-23 @ 06:09) (75 - 105)  BP: 106/67 (02-04-23 @ 06:09) (105/56 - 121/70)  RR: 18 (02-04-23 @ 06:09) (16 - 18)  SpO2: 99% (02-04-23 @ 06:09) (98% - 99%)    CAPILLARY BLOOD GLUCOSE          Physical Exam:  General: NAD  Abdomen: Soft, non-tender, gravid  Ext: No pain or swelling  Vagina: No discharge seen, no excoriations/ swelling or erythema noted on labia     NST reactive overnight    Labs:                MEDICATIONS  (STANDING):  amoxicillin 500 milliGRAM(s) Oral every 8 hours  ascorbic acid 500 milliGRAM(s) Oral daily  ferrous    sulfate 325 milliGRAM(s) Oral three times a day  heparin   Injectable 5000 Unit(s) SubCutaneous every 12 hours  influenza   Vaccine 0.5 milliLiter(s) IntraMuscular once  prenatal multivitamin 1 Tablet(s) Oral daily    MEDICATIONS  (PRN):  magnesium hydroxide Suspension 30 milliLiter(s) Oral daily PRN Constipation

## 2023-02-05 LAB
BLD GP AB SCN SERPL QL: NEGATIVE — SIGNIFICANT CHANGE UP
RH IG SCN BLD-IMP: POSITIVE — SIGNIFICANT CHANGE UP

## 2023-02-05 PROCEDURE — 99232 SBSQ HOSP IP/OBS MODERATE 35: CPT | Mod: GC

## 2023-02-05 RX ORDER — HYDROCORTISONE 1 %
1 OINTMENT (GRAM) TOPICAL THREE TIMES A DAY
Refills: 0 | Status: DISCONTINUED | OUTPATIENT
Start: 2023-02-05 | End: 2023-02-07

## 2023-02-05 RX ADMIN — Medication 325 MILLIGRAM(S): at 22:10

## 2023-02-05 RX ADMIN — HEPARIN SODIUM 5000 UNIT(S): 5000 INJECTION INTRAVENOUS; SUBCUTANEOUS at 12:10

## 2023-02-05 RX ADMIN — Medication 500 MILLIGRAM(S): at 22:10

## 2023-02-05 RX ADMIN — HEPARIN SODIUM 5000 UNIT(S): 5000 INJECTION INTRAVENOUS; SUBCUTANEOUS at 22:09

## 2023-02-05 RX ADMIN — Medication 500 MILLIGRAM(S): at 13:56

## 2023-02-05 RX ADMIN — Medication 325 MILLIGRAM(S): at 06:04

## 2023-02-05 RX ADMIN — Medication 325 MILLIGRAM(S): at 13:56

## 2023-02-05 RX ADMIN — Medication 500 MILLIGRAM(S): at 12:10

## 2023-02-05 RX ADMIN — Medication 500 MILLIGRAM(S): at 06:04

## 2023-02-05 RX ADMIN — Medication 1 TABLET(S): at 12:10

## 2023-02-05 NOTE — PROGRESS NOTE ADULT - SUBJECTIVE AND OBJECTIVE BOX
R3 Antepartum Note, HD#7    Patient seen and examined at bedside, no acute overnight events. No acute complaints. Pt reports +FM, denies LOF, VB, ctx, HA, epigastric pain, blurred vision, CP, SOB, N/V, fevers, and chills.    Vital Signs Last 24 Hours  T(C): 37.1 (02-05-23 @ 01:37), Max: 37.1 (02-04-23 @ 17:21)  HR: 89 (02-05-23 @ 01:37) (84 - 108)  BP: 118/79 (02-05-23 @ 01:37) (106/67 - 122/72)  RR: 17 (02-05-23 @ 01:37) (16 - 18)  SpO2: 98% (02-05-23 @ 01:37) (98% - 100%)    CAPILLARY BLOOD GLUCOSE          Physical Exam:  General: NAD  Abdomen: Soft, non-tender, gravid  Ext: No pain or swelling    NST reactive overnight    Labs:                MEDICATIONS  (STANDING):  amoxicillin 500 milliGRAM(s) Oral every 8 hours  ascorbic acid 500 milliGRAM(s) Oral daily  ferrous    sulfate 325 milliGRAM(s) Oral three times a day  heparin   Injectable 5000 Unit(s) SubCutaneous every 12 hours  influenza   Vaccine 0.5 milliLiter(s) IntraMuscular once  prenatal multivitamin 1 Tablet(s) Oral daily    MEDICATIONS  (PRN):  magnesium hydroxide Suspension 30 milliLiter(s) Oral daily PRN Constipation

## 2023-02-05 NOTE — PROGRESS NOTE ADULT - ASSESSMENT
32 year old  @25wk+3d admitted for concerns for PPROM@24wk+5d. Maternal and fetal status reassuring.     #PPROM  -Initial exam : + nitrazine, - pooling, +ferning,+ amnisure   -Repeat exam: + nitrazine, - pooling, - ferning , pad dry , no bleeding noted. white discharge in the vault   - c/w latency antibiotics  - s/p betamethasone for fetal lung maturity (-)  - continue to monitor fetal status and for signs of labor or infection  -GC(): neg     #Vaginal itching  -No discharge seen on evaluation  -Consider treating for yeast infection vs BV      #Fetal well being   - NST BID    #Maternal well-being  - Reg diet  - HSQ, SCDs, and ambulation for DVT prophylaxis     Davis PGY3

## 2023-02-05 NOTE — PROGRESS NOTE ADULT - NSPROGADDITIONALINFOA_GEN_ALL_CORE
MFM Fellow Addendum:    Briefly, patient is a 33 YO  at 25w4d admitted with PPROM. Patient reports leakage of clear fluid yesterday. Denies contractions, leakage of purulent/malodorous fluid, or vaginal bleeding and reports normal fetal movement. Patient afebrile and predominantly non-tachycardic. Fetal heart monitoring reactive and reassuring for gestational age (baseline 150, moderate variability, positive accelerations, no decelerations) and tocometry without contractions. Low concern for  labor, intraamniotic infection or placental abruption at this time. Patient s/p BMZ -. GBS negative.  UCx, GC/CT, and trich negative. Continues on latency antibiotics (day 6/7). Continue with expectant management of PPROM. Patient with vulvar itching, will send affirm swab.    Vianney Moore MD  [facility] Fellow  Attg: Dr. Chowdary

## 2023-02-05 NOTE — PROGRESS NOTE ADULT - ATTENDING COMMENTS
MFM ATTENDING NOTE    25w4d  pprom  stable. had additional gush of fluid last night. denies vb.   fetal status reassuring thus far  low suspicion for abruption, chorio, ptl at this time  declining transfer to [facility]  continue latency abx  routine ap care  s/p ACS 1/30-31.   eligible for rescue on 2/13 if indicated.   continue current management.

## 2023-02-06 ENCOUNTER — APPOINTMENT (OUTPATIENT)
Dept: ANTEPARTUM | Facility: CLINIC | Age: 33
End: 2023-02-06
Payer: COMMERCIAL

## 2023-02-06 ENCOUNTER — ASOB RESULT (OUTPATIENT)
Age: 33
End: 2023-02-06

## 2023-02-06 PROCEDURE — 76819 FETAL BIOPHYS PROFIL W/O NST: CPT | Mod: 26

## 2023-02-06 RX ADMIN — Medication 325 MILLIGRAM(S): at 13:50

## 2023-02-06 RX ADMIN — Medication 325 MILLIGRAM(S): at 22:15

## 2023-02-06 RX ADMIN — Medication 500 MILLIGRAM(S): at 05:59

## 2023-02-06 RX ADMIN — Medication 1 APPLICATION(S): at 06:00

## 2023-02-06 RX ADMIN — Medication 1 TABLET(S): at 11:55

## 2023-02-06 RX ADMIN — HEPARIN SODIUM 5000 UNIT(S): 5000 INJECTION INTRAVENOUS; SUBCUTANEOUS at 11:54

## 2023-02-06 RX ADMIN — Medication 500 MILLIGRAM(S): at 11:55

## 2023-02-06 RX ADMIN — Medication 500 MILLIGRAM(S): at 13:50

## 2023-02-06 RX ADMIN — HEPARIN SODIUM 5000 UNIT(S): 5000 INJECTION INTRAVENOUS; SUBCUTANEOUS at 22:15

## 2023-02-06 RX ADMIN — Medication 325 MILLIGRAM(S): at 05:59

## 2023-02-06 NOTE — PROGRESS NOTE ADULT - NSPROGADDITIONALINFOA_GEN_ALL_CORE
-  dose of lactulose lowered given pt had is having loose BM   - cholestyramine dc'd and metamucil added for stool bulking  - PEG 17g packet added PRN for contsipation  - plan to pull rectal tube, still in place  - plan to remove kim catheter, still in place  - titrate bowel regimen to about 4 BMs per day     M Fellow Addendum:    Briefly, patient is a 31 YO  at 25w5d admitted with PPROM. Denies contractions, leakage of purulent/malodorous fluid, or vaginal bleeding and reports normal fetal movement. Patient afebrile and heart rate at baseline (). Fetal heart monitoring reactive and reassuring for gestational age (baseline 150, moderate variability, positive accelerations, no decelerations) and tocometry without contractions. Low concern for  labor, intraamniotic infection or placental abruption at this time. Patient s/p BMZ -. GBS negative.  UCx, GC/CT, and trich negative. Completes latency antibiotics today. Continue with expectant management of PPROM. Patient with vulvar itching, will send affirm swab. Plan for GCT 23.    Vianney Moore MD  [facility] Fellow  Attg: Dr. Ram

## 2023-02-06 NOTE — PROGRESS NOTE ADULT - ASSESSMENT
32 year old  @25wk+5d admitted for concerns for PPROM@24wk+5d. Maternal and fetal status reassuring.     #PPROM  -Initial exam : + nitrazine, - pooling, +ferning,+ amnisure   -Repeat exam: + nitrazine, - pooling, - ferning , pad dry , no bleeding noted. white discharge in the vault   - c/w latency antibiotics  - s/p betamethasone for fetal lung maturity (-)  - continue to monitor fetal status and for signs of labor or infection  -GC(): neg     #Vaginal itching  -No discharge seen on evaluation  -will perform affirm swab today  -Consider treating for yeast infection vs BV      #Fetal well being   - NST BID  -ATU sono today    #Maternal well-being  - Reg diet  - HSQ, SCDs, and ambulation for DVT prophylaxis     Jose,PGY3

## 2023-02-06 NOTE — PROGRESS NOTE ADULT - SUBJECTIVE AND OBJECTIVE BOX
R3 Antepartum Note, HD#4    Interval events:    Patient seen and examined at bedside, no acute overnight events. Pt w/ c/o vaginal itching x2days. Pt reports +FM, denies increased LOF overnight, VB, ctx, HA, epigastric pain, blurred vision, CP, SOB, N/V, fevers, and chills.    Vital Signs Last 24 Hours  T(C): 36.7 (02-06-23 @ 09:55), Max: 37.2 (02-05-23 @ 22:16)  HR: 105 (02-06-23 @ 09:55) (94 - 115)  BP: 118/70 (02-06-23 @ 09:55) (110/68 - 119/79)  RR: 18 (02-06-23 @ 09:55) (16 - 20)  SpO2: 98% (02-06-23 @ 09:55) (79% - 100%)    Physical Exam:  General: NAD  Abdomen: Soft, non-tender, gravid  Ext: No pain or swelling    NST reactive overnight    MEDICATIONS  (STANDING):  amoxicillin 500 milliGRAM(s) Oral every 8 hours  ascorbic acid 500 milliGRAM(s) Oral daily  ferrous    sulfate 325 milliGRAM(s) Oral three times a day  heparin   Injectable 5000 Unit(s) SubCutaneous every 12 hours  influenza   Vaccine 0.5 milliLiter(s) IntraMuscular once  prenatal multivitamin 1 Tablet(s) Oral daily    MEDICATIONS  (PRN):  hydrocortisone 1% Cream 1 Application(s) Topical three times a day PRN Rash and/or Itching  magnesium hydroxide Suspension 30 milliLiter(s) Oral daily PRN Constipation

## 2023-02-07 LAB
CANDIDA AB TITR SER: DETECTED
G VAGINALIS DNA SPEC QL NAA+PROBE: DETECTED
T VAGINALIS SPEC QL WET PREP: SIGNIFICANT CHANGE UP

## 2023-02-07 PROCEDURE — 99232 SBSQ HOSP IP/OBS MODERATE 35: CPT | Mod: GC

## 2023-02-07 RX ORDER — CLOTRIMAZOLE AND BETAMETHASONE DIPROPIONATE 10; .5 MG/G; MG/G
1 CREAM TOPICAL
Refills: 0 | Status: DISCONTINUED | OUTPATIENT
Start: 2023-02-07 | End: 2023-02-15

## 2023-02-07 RX ORDER — DEXTROSE 50 % IN WATER 50 %
50 SYRINGE (ML) INTRAVENOUS ONCE
Refills: 0 | Status: COMPLETED | OUTPATIENT
Start: 2023-02-08 | End: 2023-02-08

## 2023-02-07 RX ORDER — SODIUM CHLORIDE 9 MG/ML
1000 INJECTION, SOLUTION INTRAVENOUS ONCE
Refills: 0 | Status: COMPLETED | OUTPATIENT
Start: 2023-02-07 | End: 2023-02-07

## 2023-02-07 RX ADMIN — Medication 325 MILLIGRAM(S): at 06:37

## 2023-02-07 RX ADMIN — Medication 1 TABLET(S): at 10:57

## 2023-02-07 RX ADMIN — Medication 325 MILLIGRAM(S): at 16:57

## 2023-02-07 RX ADMIN — Medication 500 MILLIGRAM(S): at 17:01

## 2023-02-07 RX ADMIN — Medication 325 MILLIGRAM(S): at 22:01

## 2023-02-07 RX ADMIN — HEPARIN SODIUM 5000 UNIT(S): 5000 INJECTION INTRAVENOUS; SUBCUTANEOUS at 22:01

## 2023-02-07 RX ADMIN — HEPARIN SODIUM 5000 UNIT(S): 5000 INJECTION INTRAVENOUS; SUBCUTANEOUS at 10:59

## 2023-02-07 RX ADMIN — SODIUM CHLORIDE 2000 MILLILITER(S): 9 INJECTION, SOLUTION INTRAVENOUS at 20:44

## 2023-02-07 NOTE — PROGRESS NOTE ADULT - NSPROGADDITIONALINFOA_GEN_ALL_CORE
MFM Fellow Addendum:    Briefly, patient is a 31 YO  at 25w6d admitted with PPROM. Denies contractions, leakage of purulent/malodorous fluid, or vaginal bleeding and reports normal fetal movement. Patient afebrile and heart rate at baseline (). Fetal heart monitoring reactive and reassuring for gestational age (baseline 150, moderate variability, positive accelerations, no decelerations) and tocometry without contractions. Low concern for  labor, intraamniotic infection or placental abruption at this time. Patient s/p BMZ -. S/p latency antibiotics. GBS negative.  UCx, GC/CT, and trich negative. F/u affirm swab. Continue with expectant management of PPROM. Plan for CBC and GCT 23.    Vianney Moore MD  [facility] Fellow  Attg: Dr. Ram

## 2023-02-07 NOTE — PROGRESS NOTE ADULT - ASSESSMENT
32 year old  @25wk+5d admitted for concerns for PPROM@24wk+6d. Maternal and fetal status reassuring.     #PPROM  -Initial exam : + nitrazine, - pooling, +ferning,+ amnisure   -Repeat exam: + nitrazine, - pooling, - ferning , pad dry , no bleeding noted. white discharge in the vault   - c/w latency antibiotics  - s/p betamethasone for fetal lung maturity (-)  - continue to monitor fetal status and for signs of labor or infection  -GC(): neg     #Vaginal itching  -No discharge seen on evaluation  -c/w  affirm swab today  -c/w hydrocortisone cream     #Fetal well being   - NST BID  -ATU sono today    #Maternal well-being  - Reg diet  - c/w VitC and PO iron   - HSQ, SCDs, and ambulation for DVT prophylaxis     Davis PGY3

## 2023-02-07 NOTE — PROGRESS NOTE ADULT - SUBJECTIVE AND OBJECTIVE BOX
R3 Antepartum Note    Patient seen and examined at bedside, no acute overnight events. No acute complaints. Pt reports +FM, denies LOF, VB, ctx, HA, epigastric pain, blurred vision, CP, SOB, N/V, fevers, and chills.    Vital Signs Last 24 Hours  T(C): 36.7 (02-07-23 @ 05:50), Max: 36.8 (02-06-23 @ 17:52)  HR: 99 (02-07-23 @ 05:50) (88 - 109)  BP: 114/63 (02-07-23 @ 05:50) (107/66 - 127/81)  RR: 18 (02-07-23 @ 05:50) (18 - 18)  SpO2: 99% (02-07-23 @ 05:50) (93% - 100%)    CAPILLARY BLOOD GLUCOSE          Physical Exam:  General: NAD  Abdomen: Soft, non-tender, gravid  Ext: No pain or swelling    NST reactive overnight    Labs:                MEDICATIONS  (STANDING):  ascorbic acid 500 milliGRAM(s) Oral daily  ferrous    sulfate 325 milliGRAM(s) Oral three times a day  heparin   Injectable 5000 Unit(s) SubCutaneous every 12 hours  influenza   Vaccine 0.5 milliLiter(s) IntraMuscular once  prenatal multivitamin 1 Tablet(s) Oral daily    MEDICATIONS  (PRN):  hydrocortisone 1% Cream 1 Application(s) Topical three times a day PRN Rash and/or Itching  magnesium hydroxide Suspension 30 milliLiter(s) Oral daily PRN Constipation

## 2023-02-07 NOTE — PROGRESS NOTE ADULT - ATTENDING COMMENTS
Patient seen and examined  No complains  FHR tracing remains cat 1 with no contractions  No signs of infection  Will continue daily monitoring for PPROM

## 2023-02-08 LAB
BLD GP AB SCN SERPL QL: NEGATIVE — SIGNIFICANT CHANGE UP
GLUCOSE 1H P MEAL SERPL-MCNC: 142 MG/DL — HIGH (ref 70–134)
HCT VFR BLD CALC: 28.5 % — LOW (ref 34.5–45)
HGB BLD-MCNC: 8.9 G/DL — LOW (ref 11.5–15.5)
MCHC RBC-ENTMCNC: 23.7 PG — LOW (ref 27–34)
MCHC RBC-ENTMCNC: 31.2 GM/DL — LOW (ref 32–36)
MCV RBC AUTO: 76 FL — LOW (ref 80–100)
NRBC # BLD: 0 /100 WBCS — SIGNIFICANT CHANGE UP (ref 0–0)
NRBC # FLD: 0 K/UL — SIGNIFICANT CHANGE UP (ref 0–0)
PLATELET # BLD AUTO: 326 K/UL — SIGNIFICANT CHANGE UP (ref 150–400)
RBC # BLD: 3.75 M/UL — LOW (ref 3.8–5.2)
RBC # FLD: 16.4 % — HIGH (ref 10.3–14.5)
RH IG SCN BLD-IMP: POSITIVE — SIGNIFICANT CHANGE UP
WBC # BLD: 9.85 K/UL — SIGNIFICANT CHANGE UP (ref 3.8–10.5)
WBC # FLD AUTO: 9.85 K/UL — SIGNIFICANT CHANGE UP (ref 3.8–10.5)

## 2023-02-08 PROCEDURE — 99221 1ST HOSP IP/OBS SF/LOW 40: CPT

## 2023-02-08 RX ORDER — DEXTROSE 50 % IN WATER 50 %
100 SYRINGE (ML) INTRAVENOUS ONCE
Refills: 0 | Status: COMPLETED | OUTPATIENT
Start: 2023-02-09 | End: 2023-02-09

## 2023-02-08 RX ADMIN — CLOTRIMAZOLE AND BETAMETHASONE DIPROPIONATE 1 APPLICATION(S): 10; .5 CREAM TOPICAL at 07:03

## 2023-02-08 RX ADMIN — Medication 50 GRAM(S): at 05:27

## 2023-02-08 RX ADMIN — Medication 325 MILLIGRAM(S): at 22:30

## 2023-02-08 RX ADMIN — HEPARIN SODIUM 5000 UNIT(S): 5000 INJECTION INTRAVENOUS; SUBCUTANEOUS at 22:29

## 2023-02-08 RX ADMIN — HEPARIN SODIUM 5000 UNIT(S): 5000 INJECTION INTRAVENOUS; SUBCUTANEOUS at 11:28

## 2023-02-08 RX ADMIN — Medication 1 TABLET(S): at 11:28

## 2023-02-08 RX ADMIN — Medication 325 MILLIGRAM(S): at 07:03

## 2023-02-08 RX ADMIN — Medication 500 MILLIGRAM(S): at 14:09

## 2023-02-08 RX ADMIN — CLOTRIMAZOLE AND BETAMETHASONE DIPROPIONATE 1 APPLICATION(S): 10; .5 CREAM TOPICAL at 22:30

## 2023-02-08 RX ADMIN — Medication 325 MILLIGRAM(S): at 14:09

## 2023-02-08 NOTE — PROGRESS NOTE ADULT - ASSESSMENT
32 year old  @26wk admitted for concerns for PPROM@24wk+6d. Maternal and fetal status reassuring.     #PPROM  -Initial exam : + nitrazine, - pooling, +ferning,+ amnisure   -Repeat exam: + nitrazine, - pooling, - ferning , pad dry , no bleeding noted. white discharge in the vault   - c/w latency antibiotics  - s/p betamethasone for fetal lung maturity (-)  - continue to monitor fetal status and for signs of labor or infection  -GC(): neg     #Vaginal itching  +BV and yeast infection  -will start metronidazole today    #Fetal well being   - NST BID  -ATU sono(): christina breech posterior palcenta MVP 6.6  -Growth scan(): vtx, 866g(84%)    #Maternal well-being  - Reg diet  - c/w VitC and PO iron   - HSQ, SCDs, and ambulation for DVT prophylaxis     Davis PGY3

## 2023-02-08 NOTE — PROGRESS NOTE ADULT - ATTENDING COMMENTS
Patient seen and examined  Has no complains  FHR tracing with moderate variability, no significant decelerations  No abdominal tenderness    will continue daily monitoring for PROM

## 2023-02-08 NOTE — PROGRESS NOTE ADULT - SUBJECTIVE AND OBJECTIVE BOX
R3 Antepartum Note, HD#26    Patient seen and examined at bedside, no acute overnight events. No acute complaints. Pt reports +FM, denies LOF, VB, ctx, HA, epigastric pain, blurred vision, CP, SOB, N/V, fevers, and chills.    Vital Signs Last 24 Hours  T(C): 36.9 (02-08-23 @ 05:43), Max: 36.9 (02-07-23 @ 14:11)  HR: 95 (02-08-23 @ 05:43) (93 - 108)  BP: 101/53 (02-08-23 @ 05:43) (101/53 - 116/72)  RR: 18 (02-08-23 @ 05:43) (18 - 20)  SpO2: 97% (02-08-23 @ 05:43) (96% - 98%)    CAPILLARY BLOOD GLUCOSE          Physical Exam:  General: NAD  Abdomen: Soft, non-tender, gravid  Ext: No pain or swelling    NST reactive overnight    Labs:                MEDICATIONS  (STANDING):  ascorbic acid 500 milliGRAM(s) Oral daily  clotrimazole/betamethasone Cream 1 Application(s) Topical two times a day  ferrous    sulfate 325 milliGRAM(s) Oral three times a day  heparin   Injectable 5000 Unit(s) SubCutaneous every 12 hours  influenza   Vaccine 0.5 milliLiter(s) IntraMuscular once  prenatal multivitamin 1 Tablet(s) Oral daily    MEDICATIONS  (PRN):  magnesium hydroxide Suspension 30 milliLiter(s) Oral daily PRN Constipation

## 2023-02-09 ENCOUNTER — APPOINTMENT (OUTPATIENT)
Dept: ANTEPARTUM | Facility: CLINIC | Age: 33
End: 2023-02-09
Payer: COMMERCIAL

## 2023-02-09 ENCOUNTER — ASOB RESULT (OUTPATIENT)
Age: 33
End: 2023-02-09

## 2023-02-09 LAB
GESTATIONAL GTT PNL UR+SERPL: 94 MG/DL — SIGNIFICANT CHANGE UP (ref 70–94)
GLUCOSE 1H P CHAL SERPL-MCNC: 159 MG/DL — SIGNIFICANT CHANGE UP (ref 70–179)
GTT GEST 2H PNL UR+SERPL: 130 MG/DL — SIGNIFICANT CHANGE UP (ref 70–154)
GTT GEST 3H PNL SERPL: 106 MG/DL — SIGNIFICANT CHANGE UP (ref 70–139)

## 2023-02-09 PROCEDURE — 76819 FETAL BIOPHYS PROFIL W/O NST: CPT | Mod: 26

## 2023-02-09 PROCEDURE — 99232 SBSQ HOSP IP/OBS MODERATE 35: CPT | Mod: GC,25

## 2023-02-09 RX ORDER — TETANUS TOXOID, REDUCED DIPHTHERIA TOXOID AND ACELLULAR PERTUSSIS VACCINE, ADSORBED 5; 2.5; 8; 8; 2.5 [IU]/.5ML; [IU]/.5ML; UG/.5ML; UG/.5ML; UG/.5ML
0.5 SUSPENSION INTRAMUSCULAR ONCE
Refills: 0 | Status: COMPLETED | OUTPATIENT
Start: 2023-02-09 | End: 2023-02-10

## 2023-02-09 RX ADMIN — Medication 1 TABLET(S): at 10:55

## 2023-02-09 RX ADMIN — Medication 325 MILLIGRAM(S): at 15:23

## 2023-02-09 RX ADMIN — HEPARIN SODIUM 5000 UNIT(S): 5000 INJECTION INTRAVENOUS; SUBCUTANEOUS at 10:55

## 2023-02-09 RX ADMIN — Medication 325 MILLIGRAM(S): at 22:00

## 2023-02-09 RX ADMIN — Medication 100 GRAM(S): at 05:16

## 2023-02-09 RX ADMIN — CLOTRIMAZOLE AND BETAMETHASONE DIPROPIONATE 1 APPLICATION(S): 10; .5 CREAM TOPICAL at 22:00

## 2023-02-09 RX ADMIN — Medication 500 MILLIGRAM(S): at 15:23

## 2023-02-09 RX ADMIN — HEPARIN SODIUM 5000 UNIT(S): 5000 INJECTION INTRAVENOUS; SUBCUTANEOUS at 22:00

## 2023-02-09 RX ADMIN — Medication 325 MILLIGRAM(S): at 06:15

## 2023-02-09 NOTE — PROGRESS NOTE ADULT - SUBJECTIVE AND OBJECTIVE BOX
R3 Antepartum Note, HD#11    Patient seen and examined at bedside, no acute overnight events. No acute complaints. Pt reports +FM, denies LOF, VB, ctx, HA, epigastric pain, blurred vision, CP, SOB, N/V, fevers, and chills.    Vital Signs Last 24 Hours  T(C): 36.8 (02-09-23 @ 06:15), Max: 37.2 (02-08-23 @ 17:36)  HR: 98 (02-09-23 @ 06:15) (86 - 104)  BP: 118/67 (02-09-23 @ 06:15) (109/66 - 118/67)  RR: 18 (02-09-23 @ 06:15) (16 - 18)  SpO2: 98% (02-09-23 @ 06:15) (97% - 99%)    CAPILLARY BLOOD GLUCOSE          Physical Exam:  General: NAD  Abdomen: Soft, non-tender, gravid  Ext: No pain or swelling    NST reactive overnight    Labs:             8.9    9.85  )-----------( 326      ( 02-08 @ 06:26 )             28.5                   MEDICATIONS  (STANDING):  ascorbic acid 500 milliGRAM(s) Oral daily  clotrimazole/betamethasone Cream 1 Application(s) Topical two times a day  ferrous    sulfate 325 milliGRAM(s) Oral three times a day  heparin   Injectable 5000 Unit(s) SubCutaneous every 12 hours  influenza   Vaccine 0.5 milliLiter(s) IntraMuscular once  prenatal multivitamin 1 Tablet(s) Oral daily    MEDICATIONS  (PRN):  magnesium hydroxide Suspension 30 milliLiter(s) Oral daily PRN Constipation   R3 Antepartum Note, HD#11    Patient seen and examined at bedside, no acute overnight events. No acute complaints. Pt reports +FM, denies LOF, VB, ctx, HA, epigastric pain, blurred vision, CP, SOB, N/V, fevers, and chills.    Vital Signs Last 24 Hours  T(C): 36.8 (02-09-23 @ 06:15), Max: 37.2 (02-08-23 @ 17:36)  HR: 98 (02-09-23 @ 06:15) (86 - 104)  BP: 118/67 (02-09-23 @ 06:15) (109/66 - 118/67)  RR: 18 (02-09-23 @ 06:15) (16 - 18)  SpO2: 98% (02-09-23 @ 06:15) (97% - 99%)      Physical Exam:  General: NAD  Abdomen: Soft, non-tender, gravid  Ext: No pain or swelling    NST reactive overnight    Labs:             8.9    9.85  )-----------( 326      ( 02-08 @ 06:26 )             28.5       MEDICATIONS  (STANDING):  ascorbic acid 500 milliGRAM(s) Oral daily  clotrimazole/betamethasone Cream 1 Application(s) Topical two times a day  ferrous    sulfate 325 milliGRAM(s) Oral three times a day  heparin   Injectable 5000 Unit(s) SubCutaneous every 12 hours  influenza   Vaccine 0.5 milliLiter(s) IntraMuscular once  prenatal multivitamin 1 Tablet(s) Oral daily    MEDICATIONS  (PRN):  magnesium hydroxide Suspension 30 milliLiter(s) Oral daily PRN Constipation

## 2023-02-09 NOTE — PROGRESS NOTE ADULT - ASSESSMENT
32 year old  @26wk admitted for concerns for PPROM@24wk+6d. No acute events overnight. Maternal and fetal status reassuring.     #PPROM  -Initial exam : + nitrazine, - pooling, +ferning,+ amnisure   -Repeat exam: + nitrazine, - pooling, - ferning , pad dry , no bleeding noted. white discharge in the vault   - c/w latency antibiotics  - s/p betamethasone for fetal lung maturity (-)  - continue to monitor fetal status and for signs of labor or infection  -GC(): neg     #Fetal well being   - NST BID  -ATU sono(): christina breech posterior palcenta MVP 6.6  -Growth scan(): vtx, 866g(84%)    #Maternal well-being  - Reg diet  - c/w VitC and PO iron   - HSQ, SCDs, and ambulation for DVT prophylaxis     Davis PGY3 32 year old  @26wk+1d admitted for concerns for PPROM@24wk+6d. No acute events overnight. Maternal and fetal status reassuring.     #PPROM  -Initial exam : + nitrazine, - pooling, +ferning,+ amnisure   -Repeat exam: + nitrazine, - pooling, - ferning , pad dry , no bleeding noted. white discharge in the vault   - c/w latency antibiotics  - s/p betamethasone for fetal lung maturity (-)  - continue to monitor fetal status and for signs of labor or infection  -GC(): neg     #Fetal well being   - NST BID  -ATU sono(): christina breech posterior palcenta MVP 6.6  -Growth scan(): vtx, 866g(84%)    #Maternal well-being  - Reg diet  - c/w VitC and PO iron   - HSQ, SCDs, and ambulation for DVT prophylaxis     Davis PGY3 32 year old  @26wk+1d admitted for concerns for PPROM@24wk+6d. No acute events overnight. Maternal and fetal status reassuring.     #PPROM  -Initial exam : + nitrazine, - pooling, +ferning,+ amnisure   -Repeat exam: + nitrazine, - pooling, - ferning , pad dry , no bleeding noted. white discharge in the vault   - c/w latency antibiotics  - s/p betamethasone for fetal lung maturity (-)  - continue to monitor fetal status and for signs of labor or intrauterine infection  -GC(): neg     #Fetal well being   - NST BID  -ATU sono(): christina breech posterior palcenta MVP 6.6  -Growth scan(): vtx, 866g(84%)    #Maternal well-being  - Reg diet  - c/w VitC and PO iron   - HSQ, SCDs, and ambulation for DVT prophylaxis     Davis PGY3

## 2023-02-09 NOTE — PROGRESS NOTE ADULT - ATTENDING COMMENTS
I saw and evaluated Ms. Machuca and agree with above.   Fetal status is reassuring.   No evidence of intrauterine infection.   Continue inpatient observation with close maternal and fetal surveillance.     Soraya Bear MD

## 2023-02-10 PROCEDURE — 99231 SBSQ HOSP IP/OBS SF/LOW 25: CPT

## 2023-02-10 RX ORDER — DIPHENHYDRAMINE HCL 50 MG
25 CAPSULE ORAL EVERY 4 HOURS
Refills: 0 | Status: DISCONTINUED | OUTPATIENT
Start: 2023-02-10 | End: 2023-02-16

## 2023-02-10 RX ORDER — SODIUM CHLORIDE 9 MG/ML
500 INJECTION, SOLUTION INTRAVENOUS ONCE
Refills: 0 | Status: COMPLETED | OUTPATIENT
Start: 2023-02-10 | End: 2023-02-10

## 2023-02-10 RX ADMIN — Medication 325 MILLIGRAM(S): at 13:31

## 2023-02-10 RX ADMIN — Medication 1 TABLET(S): at 13:31

## 2023-02-10 RX ADMIN — HEPARIN SODIUM 5000 UNIT(S): 5000 INJECTION INTRAVENOUS; SUBCUTANEOUS at 10:31

## 2023-02-10 RX ADMIN — Medication 325 MILLIGRAM(S): at 06:05

## 2023-02-10 RX ADMIN — Medication 325 MILLIGRAM(S): at 23:03

## 2023-02-10 RX ADMIN — HEPARIN SODIUM 5000 UNIT(S): 5000 INJECTION INTRAVENOUS; SUBCUTANEOUS at 23:03

## 2023-02-10 RX ADMIN — Medication 25 MILLIGRAM(S): at 23:15

## 2023-02-10 RX ADMIN — Medication 500 MILLIGRAM(S): at 13:31

## 2023-02-10 RX ADMIN — TETANUS TOXOID, REDUCED DIPHTHERIA TOXOID AND ACELLULAR PERTUSSIS VACCINE, ADSORBED 0.5 MILLILITER(S): 5; 2.5; 8; 8; 2.5 SUSPENSION INTRAMUSCULAR at 09:25

## 2023-02-10 RX ADMIN — SODIUM CHLORIDE 1000 MILLILITER(S): 9 INJECTION, SOLUTION INTRAVENOUS at 20:53

## 2023-02-10 NOTE — PROGRESS NOTE ADULT - ATTENDING COMMENTS
PAtient seen and examined  She has no complains  Abdomen soft and non tender  No evidence of infection   FHR Tracing remains cat 1  Will continue daily monitoring for PROM

## 2023-02-10 NOTE — PROGRESS NOTE ADULT - SUBJECTIVE AND OBJECTIVE BOX
R3 Antepartum Note, HD#12    Patient seen and examined at bedside, no acute overnight events. No acute complaints. Pt reports +FM, denies VB, ctx, HA, epigastric pain, blurred vision, CP, SOB, N/V, fevers, and chills.    Vital Signs Last 24 Hours  T(C): 36.6 (02-10-23 @ 05:31), Max: 36.9 (02-10-23 @ 01:42)  HR: 90 (02-10-23 @ 05:31) (90 - 107)  BP: 100/58 (02-10-23 @ 05:31) (100/58 - 133/71)  RR: 18 (02-10-23 @ 05:31) (16 - 20)  SpO2: 97% (02-10-23 @ 05:31) (93% - 99%)    CAPILLARY BLOOD GLUCOSE          Physical Exam:  General: NAD  Abdomen: Soft, non-tender, gravid  Ext: No pain or swelling    NST reactive overnight    Labs:                MEDICATIONS  (STANDING):  ascorbic acid 500 milliGRAM(s) Oral daily  clotrimazole/betamethasone Cream 1 Application(s) Topical two times a day  diphtheria/tetanus/pertussis (acellular) Vaccine (Adacel) 0.5 milliLiter(s) IntraMuscular once  ferrous    sulfate 325 milliGRAM(s) Oral three times a day  heparin   Injectable 5000 Unit(s) SubCutaneous every 12 hours  influenza   Vaccine 0.5 milliLiter(s) IntraMuscular once  prenatal multivitamin 1 Tablet(s) Oral daily    MEDICATIONS  (PRN):  magnesium hydroxide Suspension 30 milliLiter(s) Oral daily PRN Constipation

## 2023-02-10 NOTE — PROGRESS NOTE ADULT - ASSESSMENT
32 year old  @26wk+2d admitted for concerns for PPROM@24wk+6d. No acute events overnight. Maternal and fetal status reassuring.     #PPROM  -Initial exam : + nitrazine, - pooling, +ferning,+ amnisure   -Repeat exam: + nitrazine, - pooling, - ferning , pad dry , no bleeding noted. white discharge in the vault   - s/p latency antibiotics  - s/p betamethasone for fetal lung maturity (-)  - continue to monitor fetal status and for signs of labor or intrauterine infection  -GC(): neg     #Fetal well being   - NST BID  -ATU sono(): christina breech posterior palcenta MVP 6.07  -Growth scan(): vtx, 866g(84%)    #Maternal well-being  - Reg diet  - GCT elevated with GTT neg  - c/w VitC and PO iron   - HSQ, SCDs, and ambulation for DVT prophylaxis     Davis PGY3

## 2023-02-11 RX ADMIN — Medication 325 MILLIGRAM(S): at 05:03

## 2023-02-11 RX ADMIN — Medication 25 MILLIGRAM(S): at 10:39

## 2023-02-11 RX ADMIN — HEPARIN SODIUM 5000 UNIT(S): 5000 INJECTION INTRAVENOUS; SUBCUTANEOUS at 10:40

## 2023-02-11 RX ADMIN — Medication 325 MILLIGRAM(S): at 13:35

## 2023-02-11 RX ADMIN — CLOTRIMAZOLE AND BETAMETHASONE DIPROPIONATE 1 APPLICATION(S): 10; .5 CREAM TOPICAL at 19:01

## 2023-02-11 RX ADMIN — CLOTRIMAZOLE AND BETAMETHASONE DIPROPIONATE 1 APPLICATION(S): 10; .5 CREAM TOPICAL at 05:03

## 2023-02-11 RX ADMIN — Medication 325 MILLIGRAM(S): at 22:15

## 2023-02-11 RX ADMIN — Medication 1 TABLET(S): at 10:40

## 2023-02-11 RX ADMIN — Medication 500 MILLIGRAM(S): at 13:35

## 2023-02-11 RX ADMIN — HEPARIN SODIUM 5000 UNIT(S): 5000 INJECTION INTRAVENOUS; SUBCUTANEOUS at 22:16

## 2023-02-11 NOTE — PROGRESS NOTE ADULT - ASSESSMENT
32 year old  @26wk+3d admitted for concerns for PPROM@24wk+6d. No acute events overnight. Maternal and fetal status reassuring.     #PPROM  -Initial exam : + nitrazine, - pooling, +ferning,+ amnisure   -Repeat exam: + nitrazine, - pooling, - ferning , pad dry , no bleeding noted. white discharge in the vault    -Affirm +BV and Candida  - s/p latency antibiotics  - s/p betamethasone for fetal lung maturity (-)  - continue to monitor fetal status and for signs of labor or intrauterine infection  -GC(): neg     #Fetal well being   - NST BID  -ATU sono(): christina breech posterior placenta MVP 6.07  -Growth scan: vtx, 866g(84%)  -s/p Tdap 2/10    #Maternal well-being  - Reg diet  - GCT elevated with GTT neg  - c/w VitC and PO iron   - HSQ, SCDs, and ambulation for DVT prophylaxis      Bree Mendez, PGY3

## 2023-02-11 NOTE — PROGRESS NOTE ADULT - SUBJECTIVE AND OBJECTIVE BOX
R3 Antepartum Note,    Patient seen and examined at bedside, no acute overnight events. No acute complaints. Pt reports +FM, denies LOF, VB, ctx, HA, epigastric pain, blurred vision, CP, SOB, N/V, fevers, and chills.    Vital Signs Last 24 Hours  T(C): 36.9 (02-11-23 @ 05:02), Max: 37.2 (02-10-23 @ 22:25)  HR: 96 (02-11-23 @ 05:02) (90 - 109)  BP: 103/55 (02-11-23 @ 05:02) (100/58 - 122/78)  RR: 18 (02-11-23 @ 05:02) (16 - 18)  SpO2: 99% (02-11-23 @ 05:02) (93% - 100%)    CAPILLARY BLOOD GLUCOSE          Physical Exam:  General: NAD  Abdomen: Soft, non-tender, gravid  Ext: No pain or swelling    NST reactive overnight    Labs:                MEDICATIONS  (STANDING):  ascorbic acid 500 milliGRAM(s) Oral daily  clotrimazole/betamethasone Cream 1 Application(s) Topical two times a day  ferrous    sulfate 325 milliGRAM(s) Oral three times a day  heparin   Injectable 5000 Unit(s) SubCutaneous every 12 hours  influenza   Vaccine 0.5 milliLiter(s) IntraMuscular once  prenatal multivitamin 1 Tablet(s) Oral daily    MEDICATIONS  (PRN):  diphenhydrAMINE 25 milliGRAM(s) Oral every 4 hours PRN Rash and/or Itching  magnesium hydroxide Suspension 30 milliLiter(s) Oral daily PRN Constipation   R3 Antepartum Note,    Patient seen and examined at bedside, no acute overnight events. No acute complaints. Pt reports +FM, denies LOF, VB, ctx, HA, epigastric pain, blurred vision, CP, SOB, N/V, fevers, and chills.    Vital Signs Last 24 Hours  T(C): 36.9 (02-11-23 @ 05:02), Max: 37.2 (02-10-23 @ 22:25)  HR: 96 (02-11-23 @ 05:02) (90 - 109)  BP: 103/55 (02-11-23 @ 05:02) (100/58 - 122/78)  RR: 18 (02-11-23 @ 05:02) (16 - 18)  SpO2: 99% (02-11-23 @ 05:02) (93% - 100%)    Physical Exam:  General: NAD  Abdomen: Soft, non-tender, gravid  Ext: No pain or swelling    NST reactive overnight    Labs:  No new labs              MEDICATIONS  (STANDING):  ascorbic acid 500 milliGRAM(s) Oral daily  clotrimazole/betamethasone Cream 1 Application(s) Topical two times a day  ferrous    sulfate 325 milliGRAM(s) Oral three times a day  heparin   Injectable 5000 Unit(s) SubCutaneous every 12 hours  influenza   Vaccine 0.5 milliLiter(s) IntraMuscular once  prenatal multivitamin 1 Tablet(s) Oral daily    MEDICATIONS  (PRN):  diphenhydrAMINE 25 milliGRAM(s) Oral every 4 hours PRN Rash and/or Itching  magnesium hydroxide Suspension 30 milliLiter(s) Oral daily PRN Constipation

## 2023-02-11 NOTE — PROGRESS NOTE ADULT - TIME BILLING
Ms. Machuca was in the shower when I attempted to visit her today x 2.   Tracing reviewed - fetal status is reassuring.   Continue inpatient observation with close maternal and fetal surveillance.     Deliver for:  - Nonreassuring fetal heart rate tracing.   - Labor.  - Signs or symptoms of intrauterine infection.   - Heavy vaginal bleeding concerning for placental abruption.   - 34 weeks gestational age.     Soraya Bear MD

## 2023-02-12 PROCEDURE — 99232 SBSQ HOSP IP/OBS MODERATE 35: CPT | Mod: GC,25

## 2023-02-12 RX ADMIN — Medication 325 MILLIGRAM(S): at 10:57

## 2023-02-12 RX ADMIN — Medication 325 MILLIGRAM(S): at 23:10

## 2023-02-12 RX ADMIN — CLOTRIMAZOLE AND BETAMETHASONE DIPROPIONATE 1 APPLICATION(S): 10; .5 CREAM TOPICAL at 05:57

## 2023-02-12 RX ADMIN — HEPARIN SODIUM 5000 UNIT(S): 5000 INJECTION INTRAVENOUS; SUBCUTANEOUS at 10:58

## 2023-02-12 RX ADMIN — Medication 500 MILLIGRAM(S): at 10:58

## 2023-02-12 RX ADMIN — HEPARIN SODIUM 5000 UNIT(S): 5000 INJECTION INTRAVENOUS; SUBCUTANEOUS at 23:11

## 2023-02-12 RX ADMIN — Medication 325 MILLIGRAM(S): at 05:56

## 2023-02-12 RX ADMIN — Medication 1 TABLET(S): at 10:57

## 2023-02-12 RX ADMIN — CLOTRIMAZOLE AND BETAMETHASONE DIPROPIONATE 1 APPLICATION(S): 10; .5 CREAM TOPICAL at 17:43

## 2023-02-12 NOTE — PROGRESS NOTE ADULT - ASSESSMENT
32 year old  @26w4d admitted for PPROM@24w6d. No acute events overnight. Maternal and fetal status reassuring.     #PPROM  - Initial exam : + nitrazine, - pooling, +ferning,+ amnisure   - Repeat exam: + nitrazine, - pooling, - ferning , pad dry , no bleeding noted. white discharge in the vault    - Affirm +BV and Candida  - s/p latency antibiotics  - s/p betamethasone for fetal lung maturity (-)  - continue to monitor fetal status and for signs of labor or intrauterine infection  - GC(): neg     #Fetal well being   - NST BID  - ATU sono(): christina breech posterior placenta MVP 6.07  - Growth scan: vtx, 866g(84%)  - s/p Tdap 2/10    #Maternal well-being  - Reg diet  - GCT elevated with GTT neg  - c/w VitC and PO iron   - HSQ, SCDs, and ambulation for DVT prophylaxis      Octavia Packer PGY3 32 year old  @26w4d admitted for PPROM@24w6d. No acute events overnight. Maternal and fetal status reassuring.     #PPROM  - Initial exam : + nitrazine, - pooling, +ferning,+ amnisure   - Repeat exam: + nitrazine, - pooling, - ferning , pad dry , no bleeding noted. white discharge in the vault    - Affirm +BV and Candida  - s/p latency antibiotics  - s/p betamethasone for fetal lung maturity (-)  - continue to monitor fetal status and for signs of labor or intrauterine infection  - GC(): neg     #Fetal well being   - NST BID  - ATU sono(): christina breech posterior placenta MVP 6.07  - Growth scan: vtx, 866g(84%)  - s/p Tdap 2/10    #Maternal well-being  - Reg diet  - GCT elevated with GTT neg  - c/w VitC and PO iron   - HSQ, SCDs, and ambulation for DVT prophylaxis      Octavia Packer PGY3      MFM Fellow Progress Note    32 year old  @26w4d admitted for PPROM since 24w6d. VSS. Asxs. No s/s of chorio. EFM reassuring. Will cnt expectant mng.    Patient discussed with Dr. Bear (M attending)    Umair Joaquin M.D. JOSE PGY-6  Maternal Fetal Medicine Fellow  Cell: [phone] if after 5pm or weekend ask labor and delivery for on call fellow

## 2023-02-12 NOTE — PROGRESS NOTE ADULT - SUBJECTIVE AND OBJECTIVE BOX
R3 Antepartum Note    HD#14    Patient seen and examined at bedside, no acute overnight events. No acute complaints. Pt reports +FM, denies LOF, VB, ctx, HA, epigastric pain, blurred vision, CP, SOB, N/V, fevers, and chills.    Vital Signs Last 24 Hours  T(C): 37 (02-12-23 @ 06:28), Max: 37 (02-12-23 @ 06:28)  HR: 113 (02-12-23 @ 06:28) (101 - 147)  BP: 112/63 (02-12-23 @ 06:28) (109/64 - 152/65)  RR: 20 (02-12-23 @ 06:28) (15 - 20)  SpO2: 96% (02-12-23 @ 06:28) (95% - 99%)    Physical Exam:  General: NAD  Abdomen: Soft, non-tender, gravid  Ext: No pain or swelling    NST reactive overnight      MEDICATIONS  (STANDING):  ascorbic acid 500 milliGRAM(s) Oral daily  clotrimazole/betamethasone Cream 1 Application(s) Topical two times a day  ferrous    sulfate 325 milliGRAM(s) Oral three times a day  heparin   Injectable 5000 Unit(s) SubCutaneous every 12 hours  influenza   Vaccine 0.5 milliLiter(s) IntraMuscular once  prenatal multivitamin 1 Tablet(s) Oral daily    MEDICATIONS  (PRN):  diphenhydrAMINE 25 milliGRAM(s) Oral every 4 hours PRN Rash and/or Itching  magnesium hydroxide Suspension 30 milliLiter(s) Oral daily PRN Constipation

## 2023-02-12 NOTE — PROGRESS NOTE ADULT - ATTENDING COMMENTS
I saw and evaluated Ms. Machuca and agree with above.  Continue inpatient observation with close maternal and fetal surveillance.     Soraya Bear MD

## 2023-02-13 ENCOUNTER — ASOB RESULT (OUTPATIENT)
Age: 33
End: 2023-02-13

## 2023-02-13 ENCOUNTER — APPOINTMENT (OUTPATIENT)
Dept: ANTEPARTUM | Facility: CLINIC | Age: 33
End: 2023-02-13
Payer: COMMERCIAL

## 2023-02-13 PROCEDURE — 99232 SBSQ HOSP IP/OBS MODERATE 35: CPT | Mod: GC

## 2023-02-13 PROCEDURE — 76819 FETAL BIOPHYS PROFIL W/O NST: CPT | Mod: 26

## 2023-02-13 RX ORDER — METRONIDAZOLE 500 MG
500 TABLET ORAL
Refills: 0 | Status: DISCONTINUED | OUTPATIENT
Start: 2023-02-13 | End: 2023-02-16

## 2023-02-13 RX ADMIN — Medication 325 MILLIGRAM(S): at 22:09

## 2023-02-13 RX ADMIN — Medication 500 MILLIGRAM(S): at 18:45

## 2023-02-13 RX ADMIN — Medication 1 TABLET(S): at 10:59

## 2023-02-13 RX ADMIN — HEPARIN SODIUM 5000 UNIT(S): 5000 INJECTION INTRAVENOUS; SUBCUTANEOUS at 22:09

## 2023-02-13 RX ADMIN — CLOTRIMAZOLE AND BETAMETHASONE DIPROPIONATE 1 APPLICATION(S): 10; .5 CREAM TOPICAL at 18:44

## 2023-02-13 RX ADMIN — Medication 500 MILLIGRAM(S): at 13:45

## 2023-02-13 RX ADMIN — CLOTRIMAZOLE AND BETAMETHASONE DIPROPIONATE 1 APPLICATION(S): 10; .5 CREAM TOPICAL at 06:41

## 2023-02-13 RX ADMIN — HEPARIN SODIUM 5000 UNIT(S): 5000 INJECTION INTRAVENOUS; SUBCUTANEOUS at 11:00

## 2023-02-13 RX ADMIN — Medication 325 MILLIGRAM(S): at 06:42

## 2023-02-13 RX ADMIN — Medication 325 MILLIGRAM(S): at 13:45

## 2023-02-13 NOTE — PROGRESS NOTE ADULT - SUBJECTIVE AND OBJECTIVE BOX
R3 Antepartum Note, HD#15    Patient seen and examined at bedside, no acute overnight events. No acute complaints. Pt reports +FM, denies LOF, VB, ctx, HA, epigastric pain, blurred vision, CP, SOB, N/V, fevers, and chills.    Vital Signs Last 24 Hours  T(C): 36.9 (02-13-23 @ 06:10), Max: 37 (02-12-23 @ 18:26)  HR: 95 (02-13-23 @ 06:10) (90 - 104)  BP: 104/56 (02-13-23 @ 06:10) (102/63 - 123/75)  RR: 17 (02-13-23 @ 06:10) (16 - 18)  SpO2: 98% (02-13-23 @ 06:10) (95% - 100%)    CAPILLARY BLOOD GLUCOSE          Physical Exam:  General: NAD  Abdomen: Soft, non-tender, gravid  Ext: No pain or swelling    NST reactive overnight    Labs:                MEDICATIONS  (STANDING):  ascorbic acid 500 milliGRAM(s) Oral daily  clotrimazole/betamethasone Cream 1 Application(s) Topical two times a day  ferrous    sulfate 325 milliGRAM(s) Oral three times a day  heparin   Injectable 5000 Unit(s) SubCutaneous every 12 hours  influenza   Vaccine 0.5 milliLiter(s) IntraMuscular once  prenatal multivitamin 1 Tablet(s) Oral daily    MEDICATIONS  (PRN):  clobetasol 0.05% Cream 1 Application(s) Topical two times a day PRN hives/rash  diphenhydrAMINE 25 milliGRAM(s) Oral every 4 hours PRN Rash and/or Itching  magnesium hydroxide Suspension 30 milliLiter(s) Oral daily PRN Constipation

## 2023-02-13 NOTE — PROGRESS NOTE ADULT - ATTENDING COMMENTS
MFM ATTENDING NOTE    26w5d  pprom  stable. still with intermittent small gushes. says on Thursday last week she had leakage down her leg which formed a "small puddle" on the floor.   fetal status reassuring thus far  low suspicion for abruption, chorio, ptl at this time  declining transfer to [facility]  routine ap care  s/p ACS 1/30-31.   eligible for rescue as of 2/13 if indicated.   mag neuroprotection if delivery imminent.   continue current management.

## 2023-02-13 NOTE — PROGRESS NOTE ADULT - NSPROGADDITIONALINFOA_GEN_ALL_CORE
MFM Fellow Addendum:    Briefly, patient is a 33 YO  at 26w5d admitted with PPROM. Denies contractions, leakage of purulent/malodorous fluid, or vaginal bleeding and reports normal fetal movement. Patient has had resolution of vulvar itching. Patient afebrile and heart rate at baseline (). Fetal heart monitoring reassuring for gestational age (baseline 150, moderate variability, positive accelerations, single 90 second deceleration) and tocometry without contractions. Low concern for  labor, intraamniotic infection or placental abruption at this time. Patient s/p BMZ -. S/p latency antibiotics. GBS negative.  UCx, GC/CT, and trich negative. Affirm swab positive for BV and yeast. Patient is s/p treatment for yeast, will administer course of flagyl for BV. Continue with expectant management of PPROM.     Vianney Moore MD  M Fellow  Attg: Dr. Chowdary MFM Fellow Addendum:    Briefly, patient is a 31 YO  at 26w5d admitted with PPROM. Denies contractions, leakage of purulent/malodorous fluid, or vaginal bleeding and reports normal fetal movement. Patient has had resolution of vulvar itching. Patient afebrile and heart rate at baseline (). Fetal heart monitoring reassuring for gestational age (baseline 150, moderate variability, positive accelerations, single 90 second deceleration) and tocometry without contractions. Low concern for  labor, intraamniotic infection or placental abruption at this time. Patient s/p BMZ -. S/p latency antibiotics. GBS negative.  UCx, GC/CT, and trich negative. Affirm swab positive for BV and yeast. Patient is s/p treatment for yeast, will administer course of flagyl for BV. Twice daily fetal monitoring for 1 hour and twice weekly BPP (today). Continue with expectant management of PPROM.     Vianney Moore MD  [facility] Fellow  Attg: Dr. Chowdary

## 2023-02-13 NOTE — PROGRESS NOTE ADULT - ASSESSMENT
32 year old  @26w5d admitted for PPROM@24w6d. No acute events overnight. Maternal and fetal status reassuring.     #PPROM  - Initial exam : + nitrazine, - pooling, +ferning,+ amnisure   - Repeat exam: + nitrazine, - pooling, - ferning , pad dry , no bleeding noted. white discharge in the vault    - Affirm +BV and Candida  - s/p latency antibiotics  - s/p betamethasone for fetal lung maturity (-)  - continue to monitor fetal status and for signs of labor or intrauterine infection  - GC(): neg     #Fetal well being   - NST BID  - ATU sono(): christina breech posterior placenta MVP 6.07  - Growth scan(): vtx, 866g(84%)  - s/p Tdap 2/10    #Maternal well-being  - Reg diet  - GCT elevated with GTT neg  - c/w VitC and PO iron   - HSQ, SCDs, and ambulation for DVT prophylaxis      Davis  PGY3

## 2023-02-14 PROCEDURE — 99232 SBSQ HOSP IP/OBS MODERATE 35: CPT | Mod: GC

## 2023-02-14 RX ORDER — SODIUM CHLORIDE 9 MG/ML
1000 INJECTION, SOLUTION INTRAVENOUS ONCE
Refills: 0 | Status: COMPLETED | OUTPATIENT
Start: 2023-02-14 | End: 2023-02-14

## 2023-02-14 RX ADMIN — Medication 325 MILLIGRAM(S): at 06:24

## 2023-02-14 RX ADMIN — Medication 500 MILLIGRAM(S): at 06:45

## 2023-02-14 RX ADMIN — HEPARIN SODIUM 5000 UNIT(S): 5000 INJECTION INTRAVENOUS; SUBCUTANEOUS at 12:52

## 2023-02-14 RX ADMIN — SODIUM CHLORIDE 1000 MILLILITER(S): 9 INJECTION, SOLUTION INTRAVENOUS at 21:32

## 2023-02-14 RX ADMIN — Medication 325 MILLIGRAM(S): at 12:51

## 2023-02-14 RX ADMIN — HEPARIN SODIUM 5000 UNIT(S): 5000 INJECTION INTRAVENOUS; SUBCUTANEOUS at 23:23

## 2023-02-14 RX ADMIN — Medication 500 MILLIGRAM(S): at 12:51

## 2023-02-14 RX ADMIN — Medication 325 MILLIGRAM(S): at 23:22

## 2023-02-14 RX ADMIN — Medication 500 MILLIGRAM(S): at 18:13

## 2023-02-14 RX ADMIN — Medication 1 TABLET(S): at 12:51

## 2023-02-14 RX ADMIN — Medication 1 APPLICATION(S): at 06:25

## 2023-02-14 RX ADMIN — CLOTRIMAZOLE AND BETAMETHASONE DIPROPIONATE 1 APPLICATION(S): 10; .5 CREAM TOPICAL at 06:50

## 2023-02-14 NOTE — PROGRESS NOTE ADULT - ASSESSMENT
32 year old  @26w6d admitted for PPROM@24w6d. No acute events overnight. Maternal and fetal status reassuring.     #PPROM  - Initial exam : + nitrazine, - pooling, +ferning,+ amnisure   - Repeat exam: + nitrazine, - pooling, - ferning , pad dry , no bleeding noted. white discharge in the vault    - Affirm +BV and Candida; started on 7 days of flagyl (-)  - s/p latency antibiotics  - s/p betamethasone for fetal lung maturity (-)  - continue to monitor fetal status and for signs of labor or intrauterine infection  - GC(): neg     #Fetal well being   - NST BID  - ATU sono(): christina breech posterior placenta MVP 6.07  - Growth scan(): vtx, 866g(84%)  - s/p Tdap 2/10    #Maternal well-being  - Reg diet  - GCT elevated with GTT neg  - c/w VitC and PO iron   - HSQ, SCDs, and ambulation for DVT prophylaxis      Davis  PGY3 32 year old  @26w6d admitted for PPROM@24w6d. No acute events overnight. Maternal and fetal status reassuring.     #PPROM  - Initial exam : + nitrazine, - pooling, +ferning,+ amnisure   - Repeat exam: + nitrazine, - pooling, - ferning , pad dry , no bleeding noted. white discharge in the vault    - Affirm +BV and Candida; started on 7 days of flagyl (-)  - s/p latency antibiotics  - s/p betamethasone for fetal lung maturity (-)  - continue to monitor fetal status and for signs of labor or intrauterine infection  - GC(): neg     #Fetal well being   - NST BID  - ATU (): vertex, posterior, MVP: 7.1, 8/8  - Growth scan(): vtx, 866g(84%)  - s/p Tdap 2/10    #Maternal well-being  - Reg diet  - GCT elevated with GTT neg  - c/w VitC and PO iron   - HSQ, SCDs, and ambulation for DVT prophylaxis      Davis  PGY3

## 2023-02-14 NOTE — PROGRESS NOTE ADULT - SUBJECTIVE AND OBJECTIVE BOX
R3 Antepartum Note, HD#16    Patient seen and examined at bedside, no acute overnight events. No acute complaints. Pt reports +FM, denies LOF, VB, ctx, HA, epigastric pain, blurred vision, CP, SOB, N/V, fevers, and chills.    Vital Signs Last 24 Hours  T(C): 36.8 (02-14-23 @ 06:02), Max: 37.3 (02-13-23 @ 17:42)  HR: 103 (02-14-23 @ 06:02) (96 - 112)  BP: 117/62 (02-14-23 @ 06:02) (114/71 - 127/86)  RR: 18 (02-14-23 @ 06:02) (15 - 18)  SpO2: 98% (02-14-23 @ 06:02) (98% - 100%)    CAPILLARY BLOOD GLUCOSE          Physical Exam:  General: NAD  Abdomen: Soft, non-tender, gravid  Ext: No pain or swelling    NST reactive overnight    Labs:                MEDICATIONS  (STANDING):  ascorbic acid 500 milliGRAM(s) Oral daily  clotrimazole/betamethasone Cream 1 Application(s) Topical two times a day  ferrous    sulfate 325 milliGRAM(s) Oral three times a day  heparin   Injectable 5000 Unit(s) SubCutaneous every 12 hours  influenza   Vaccine 0.5 milliLiter(s) IntraMuscular once  metroNIDAZOLE    Tablet 500 milliGRAM(s) Oral two times a day  prenatal multivitamin 1 Tablet(s) Oral daily    MEDICATIONS  (PRN):  clobetasol 0.05% Cream 1 Application(s) Topical two times a day PRN hives/rash  diphenhydrAMINE 25 milliGRAM(s) Oral every 4 hours PRN Rash and/or Itching  magnesium hydroxide Suspension 30 milliLiter(s) Oral daily PRN Constipation

## 2023-02-14 NOTE — PROGRESS NOTE ADULT - NSPROGADDITIONALINFOA_GEN_ALL_CORE
M Fellow Addendum:    Briefly, patient is a 31 YO  at 26w6d admitted with PPROM. Denies contractions, leakage of purulent/malodorous fluid, or vaginal bleeding and reports normal fetal movement. Patient afebrile and heart rate at baseline (). Fetal heart monitoring reassuring for gestational age (baseline 150, moderate variability, positive accelerations, no decleerations) and tocometry without contractions. Low concern for  labor, intraamniotic infection or placental abruption at this time. Patient s/p BMZ -. Rescue betamethasone eligible. S/p latency antibiotics. GBS negative.  UCx, GC/CT, and trich negative. Complete course of flagyl for BV. Twice daily fetal monitoring for 1 hour and twice weekly BPP. Continue with expectant management of PPROM.     Vianney Moore MD  [facility] Fellow  Attg: Dr. Chowdary

## 2023-02-14 NOTE — PROGRESS NOTE ADULT - ATTENDING COMMENTS
MFM ATTENDING NOTE    26w6d  pprom  stable.   fetal status reassuring thus far  low suspicion for abruption, chorio, ptl at this time  declining transfer to [facility]  routine ap care  s/p ACS 1/30-31.   eligible for rescue as of 2/13 if indicated.   mag neuroprotection if delivery imminent.   continue current management.

## 2023-02-15 LAB
AMNISURE ROM (RUPTURE OF MEMBRANES): NEGATIVE — SIGNIFICANT CHANGE UP
BASOPHILS # BLD AUTO: 0.06 K/UL — SIGNIFICANT CHANGE UP (ref 0–0.2)
BASOPHILS NFR BLD AUTO: 0.6 % — SIGNIFICANT CHANGE UP (ref 0–2)
EOSINOPHIL # BLD AUTO: 0.46 K/UL — SIGNIFICANT CHANGE UP (ref 0–0.5)
EOSINOPHIL NFR BLD AUTO: 4.9 % — SIGNIFICANT CHANGE UP (ref 0–6)
HCT VFR BLD CALC: 29.5 % — LOW (ref 34.5–45)
HGB BLD-MCNC: 9.2 G/DL — LOW (ref 11.5–15.5)
IANC: 5.81 K/UL — SIGNIFICANT CHANGE UP (ref 1.8–7.4)
IMM GRANULOCYTES NFR BLD AUTO: 1 % — HIGH (ref 0–0.9)
LYMPHOCYTES # BLD AUTO: 2.2 K/UL — SIGNIFICANT CHANGE UP (ref 1–3.3)
LYMPHOCYTES # BLD AUTO: 23.6 % — SIGNIFICANT CHANGE UP (ref 13–44)
MCHC RBC-ENTMCNC: 24 PG — LOW (ref 27–34)
MCHC RBC-ENTMCNC: 31.2 GM/DL — LOW (ref 32–36)
MCV RBC AUTO: 77 FL — LOW (ref 80–100)
MONOCYTES # BLD AUTO: 0.71 K/UL — SIGNIFICANT CHANGE UP (ref 0–0.9)
MONOCYTES NFR BLD AUTO: 7.6 % — SIGNIFICANT CHANGE UP (ref 2–14)
NEUTROPHILS # BLD AUTO: 5.81 K/UL — SIGNIFICANT CHANGE UP (ref 1.8–7.4)
NEUTROPHILS NFR BLD AUTO: 62.3 % — SIGNIFICANT CHANGE UP (ref 43–77)
NRBC # BLD: 0 /100 WBCS — SIGNIFICANT CHANGE UP (ref 0–0)
NRBC # FLD: 0 K/UL — SIGNIFICANT CHANGE UP (ref 0–0)
PLATELET # BLD AUTO: 315 K/UL — SIGNIFICANT CHANGE UP (ref 150–400)
RBC # BLD: 3.83 M/UL — SIGNIFICANT CHANGE UP (ref 3.8–5.2)
RBC # FLD: 17.2 % — HIGH (ref 10.3–14.5)
WBC # BLD: 9.33 K/UL — SIGNIFICANT CHANGE UP (ref 3.8–10.5)
WBC # FLD AUTO: 9.33 K/UL — SIGNIFICANT CHANGE UP (ref 3.8–10.5)

## 2023-02-15 PROCEDURE — 99232 SBSQ HOSP IP/OBS MODERATE 35: CPT

## 2023-02-15 RX ADMIN — HEPARIN SODIUM 5000 UNIT(S): 5000 INJECTION INTRAVENOUS; SUBCUTANEOUS at 10:35

## 2023-02-15 RX ADMIN — Medication 325 MILLIGRAM(S): at 21:59

## 2023-02-15 RX ADMIN — HEPARIN SODIUM 5000 UNIT(S): 5000 INJECTION INTRAVENOUS; SUBCUTANEOUS at 21:58

## 2023-02-15 RX ADMIN — Medication 500 MILLIGRAM(S): at 06:23

## 2023-02-15 RX ADMIN — Medication 325 MILLIGRAM(S): at 14:42

## 2023-02-15 RX ADMIN — Medication 500 MILLIGRAM(S): at 18:23

## 2023-02-15 RX ADMIN — Medication 500 MILLIGRAM(S): at 10:35

## 2023-02-15 RX ADMIN — Medication 325 MILLIGRAM(S): at 06:23

## 2023-02-15 RX ADMIN — Medication 1 TABLET(S): at 10:35

## 2023-02-15 NOTE — PROGRESS NOTE ADULT - SUBJECTIVE AND OBJECTIVE BOX
R3 Antepartum Note, HD#17    Patient seen and examined at bedside, no acute overnight events. No acute complaints. Pt reports +FM, denies LOF, VB, ctx, HA, epigastric pain, blurred vision, CP, SOB, N/V, fevers, and chills.    Vital Signs Last 24 Hours  T(C): 37 (02-15-23 @ 06:23), Max: 37 (02-15-23 @ 06:23)  HR: 114 (02-15-23 @ 06:23) (94 - 114)  BP: 115/63 (02-15-23 @ 06:23) (115/63 - 123/77)  RR: 20 (02-15-23 @ 06:23) (12 - 20)  SpO2: 98% (02-15-23 @ 06:23) (98% - 100%)    CAPILLARY BLOOD GLUCOSE          Physical Exam:  General: NAD  Abdomen: Soft, non-tender, gravid  Ext: No pain or swelling    NST reactive overnight    Labs:             9.2    9.33  )-----------( 315      ( 02-15 @ 06:34 )             29.5                   MEDICATIONS  (STANDING):  ascorbic acid 500 milliGRAM(s) Oral daily  clotrimazole/betamethasone Cream 1 Application(s) Topical two times a day  ferrous    sulfate 325 milliGRAM(s) Oral three times a day  heparin   Injectable 5000 Unit(s) SubCutaneous every 12 hours  metroNIDAZOLE    Tablet 500 milliGRAM(s) Oral two times a day  prenatal multivitamin 1 Tablet(s) Oral daily    MEDICATIONS  (PRN):  clobetasol 0.05% Cream 1 Application(s) Topical two times a day PRN hives/rash  diphenhydrAMINE 25 milliGRAM(s) Oral every 4 hours PRN Rash and/or Itching  magnesium hydroxide Suspension 30 milliLiter(s) Oral daily PRN Constipation

## 2023-02-15 NOTE — PROGRESS NOTE ADULT - ATTENDING COMMENTS
Patient seen and examined  No complains  Has not had leaking of fluid for past 2-3 days  FHR tracing shows moderate variability with occasional variable decelerations  Will do speculum exam today o see if fluid leaking has sealed off.  meanwhile continue conservative management

## 2023-02-15 NOTE — PROGRESS NOTE ADULT - ATTENDING SUPERVISION STATEMENT
Resident
Resident
Resident/Fellow
Resident
Resident/Fellow
Resident
Resident/Fellow
Resident/Fellow

## 2023-02-15 NOTE — PROGRESS NOTE ADULT - ASSESSMENT
32 year old  @27w admitted for PPROM@24w6d. No acute events overnight. Maternal and fetal status reassuring.     #PPROM  -no signs or symptoms of infection. No leukocytosis on AM labs  - Initial exam : + nitrazine, - pooling, +ferning,+ amnisure   - Repeat exam: + nitrazine, - pooling, - ferning , pad dry , no bleeding noted. white discharge in the vault    - Affirm +BV and Candida; started on 7 days of flagyl (-)  - s/p latency antibiotics  - s/p betamethasone for fetal lung maturity (-)  - continue to monitor fetal status and for signs of labor or intrauterine infection  - GC(): neg     #Fetal well being   - NST BID  - ATU (): vertex, posterior, MVP: 7.1, 8/8  - Growth scan(): vtx, 866g(84%)  - s/p Tdap 2/10    #Maternal well-being  - Reg diet  - GCT elevated with GTT neg  - c/w VitC and PO iron   - HSQ, SCDs, and ambulation for DVT prophylaxis      Davis  PGY3

## 2023-02-15 NOTE — CHART NOTE - NSCHARTNOTEFT_GEN_A_CORE
Patient reports she has not leaked any fluid in about a week. MVP has been stable at 6-7. Patient denies contractions, vaginal bleeding. Endorses +FM.     PE:  Vital Signs Last 24 Hrs  T(C): 36.8 (15 Feb 2023 13:46), Max: 37.1 (15 Feb 2023 09:52)  T(F): 98.3 (15 Feb 2023 13:46), Max: 98.7 (15 Feb 2023 09:52)  HR: 102 (15 Feb 2023 13:46) (94 - 114)  BP: 122/68 (15 Feb 2023 13:46) (113/67 - 123/77)  BP(mean): --  RR: 16 (15 Feb 2023 13:46) (16 - 20)  SpO2: 98% (15 Feb 2023 13:46) (98% - 100%)    Parameters below as of 15 Feb 2023 13:46  Patient On (Oxygen Delivery Method): room air    EFM: 145 moderate variability + acels no decels  [place]: none    SSE: negative pooling/ small leukorrhea/ negative Nitrazine/ Negative Amnisure    Plan:  - Repeat ATU sonogram and SSE tomorrow/Friday if continues to remain negative and patient still has no leaking consider PPROM resealed and consider discharge home with close outpatient follow up    d/w Dr. Moore and Leanna RUPERTO Hawkins NP

## 2023-02-16 ENCOUNTER — ASOB RESULT (OUTPATIENT)
Age: 33
End: 2023-02-16

## 2023-02-16 ENCOUNTER — APPOINTMENT (OUTPATIENT)
Dept: ANTEPARTUM | Facility: CLINIC | Age: 33
End: 2023-02-16
Payer: COMMERCIAL

## 2023-02-16 ENCOUNTER — APPOINTMENT (OUTPATIENT)
Dept: OBGYN | Facility: CLINIC | Age: 33
End: 2023-02-16

## 2023-02-16 ENCOUNTER — NON-APPOINTMENT (OUTPATIENT)
Age: 33
End: 2023-02-16

## 2023-02-16 VITALS
HEART RATE: 98 BPM | SYSTOLIC BLOOD PRESSURE: 103 MMHG | TEMPERATURE: 98 F | RESPIRATION RATE: 17 BRPM | DIASTOLIC BLOOD PRESSURE: 63 MMHG | OXYGEN SATURATION: 98 %

## 2023-02-16 LAB — AMNISURE ROM (RUPTURE OF MEMBRANES): NEGATIVE — SIGNIFICANT CHANGE UP

## 2023-02-16 PROCEDURE — 76819 FETAL BIOPHYS PROFIL W/O NST: CPT | Mod: 26

## 2023-02-16 RX ORDER — ASCORBIC ACID 60 MG
1 TABLET,CHEWABLE ORAL
Qty: 0 | Refills: 0 | DISCHARGE
Start: 2023-02-16

## 2023-02-16 RX ORDER — METRONIDAZOLE 500 MG
1 TABLET ORAL
Qty: 8 | Refills: 0
Start: 2023-02-16 | End: 2023-02-19

## 2023-02-16 RX ADMIN — Medication 500 MILLIGRAM(S): at 10:41

## 2023-02-16 RX ADMIN — HEPARIN SODIUM 5000 UNIT(S): 5000 INJECTION INTRAVENOUS; SUBCUTANEOUS at 10:41

## 2023-02-16 RX ADMIN — Medication 500 MILLIGRAM(S): at 06:20

## 2023-02-16 RX ADMIN — Medication 325 MILLIGRAM(S): at 06:20

## 2023-02-16 RX ADMIN — Medication 1 TABLET(S): at 10:41

## 2023-02-16 NOTE — PROGRESS NOTE ADULT - NSPROGADDITIONALINFOA_GEN_ALL_CORE
M Fellow Addendum:    Briefly, patient is a 31 YO  at 27w1d admitted with PPROM. Patient denies leakage of fluid. Denies contractions, leakage of purulent/malodorous fluid, or vaginal bleeding and reports normal fetal movement. Patient afebrile and heart rate at baseline (). Reactive NST. Low concern for  labor, intraamniotic infection or placental abruption at this time. Given cessation of leakage, patient underwent repeat SSE and amnisure 2/15/23 and 23 both with negative results. Normal SJ. Findings suggest membranes have sealed off and thus patient is a candidate for discharge home. To follow-up with primary obstetrician in 1 week.    Vianney Moore MD  [facility] Fellow  Attg: Dr. Murcia

## 2023-02-16 NOTE — PROGRESS NOTE ADULT - ASSESSMENT
32 year old  @27w1d admitted for PPROM@24w6d. No acute events overnight. Maternal and fetal status reassuring. Repeat SSE yesterday with neg findings.     #PPROM  - Initial exam : + nitrazine, - pooling, +ferning,+ amnisure   - Repeat exam: + nitrazine, - pooling, - ferning , pad dry , no bleeding noted. white discharge in the vault  -Repeat exam(2/15): -nitrazine, -pooling. -ferning. -amnisure    - Affirm +BV and Candida; started on 7 days of flagyl (-)  - s/p latency antibiotics  - s/p betamethasone for fetal lung maturity (-)  - continue to monitor fetal status and for signs of labor or intrauterine infection  - GC(): neg     #Fetal well being   - NST BID  - ATU (): vertex, posterior, MVP: 7.1, 8/8  - Growth scan(): vtx, 866g(84%)  - s/p Tdap 2/10    #Maternal well-being  - Reg diet  - GCT elevated with GTT neg  - c/w VitC and PO iron   - HSQ, SCDs, and ambulation for DVT prophylaxis      Davis  PGY3

## 2023-02-16 NOTE — CHART NOTE - NSCHARTNOTEFT_GEN_A_CORE
Patient seen for repeat SSE/Amnisure. SSE and amnisure negative yesterday 2/15. SJ today 18. Patient reports she has not leaked any fluid in about a week. Patient denies contractions, vaginal bleeding. Endorses +FM.     PE:  Vital Signs Last 24 Hrs  T(C): 36.9 (16 Feb 2023 09:33), Max: 37.1 (16 Feb 2023 05:59)  T(F): 98.5 (16 Feb 2023 09:33), Max: 98.8 (16 Feb 2023 05:59)  HR: 98 (16 Feb 2023 09:33) (92 - 110)  BP: 103/63 (16 Feb 2023 09:33) (95/53 - 122/68)  BP(mean): --  RR: 17 (16 Feb 2023 09:33) (16 - 18)  SpO2: 98% (16 Feb 2023 09:33) (96% - 99%)    Parameters below as of 16 Feb 2023 09:33  Patient On (Oxygen Delivery Method): room air    EFM: 145 moderate variability + acels no decels  [place]: none   SSE: negative pooling/ negative Nitrazine/ negative amnisure      P0 at 27.1 weeks admitted with PPROM, now resealed SJ 18 BPP 8/8 FHT reactive  - patient given strict PTL precautions, and has follow up with Dr Carvalho next week    d/w Dr Twin Hawkins [facility]

## 2023-02-16 NOTE — PROGRESS NOTE ADULT - SUBJECTIVE AND OBJECTIVE BOX
R3 Antepartum Note, HD#18    Patient seen and examined at bedside, no acute overnight events. No acute complaints. Pt reports +FM, denies LOF, VB, ctx, HA, epigastric pain, blurred vision, CP, SOB, N/V, fevers, and chills.    Vital Signs Last 24 Hours  T(C): 37.1 (02-16-23 @ 05:59), Max: 37.1 (02-15-23 @ 09:52)  HR: 92 (02-16-23 @ 05:59) (92 - 104)  BP: 95/53 (02-16-23 @ 05:59) (95/53 - 122/68)  RR: 18 (02-16-23 @ 05:59) (16 - 18)  SpO2: 97% (02-16-23 @ 05:59) (96% - 99%)    CAPILLARY BLOOD GLUCOSE          Physical Exam:  General: NAD  Abdomen: Soft, non-tender, gravid  Ext: No pain or swelling    NST reactive overnight    Labs:             9.2    9.33  )-----------( 315      ( 02-15 @ 06:34 )             29.5                   MEDICATIONS  (STANDING):  ascorbic acid 500 milliGRAM(s) Oral daily  ferrous    sulfate 325 milliGRAM(s) Oral three times a day  heparin   Injectable 5000 Unit(s) SubCutaneous every 12 hours  metroNIDAZOLE    Tablet 500 milliGRAM(s) Oral two times a day  prenatal multivitamin 1 Tablet(s) Oral daily    MEDICATIONS  (PRN):  clobetasol 0.05% Cream 1 Application(s) Topical two times a day PRN hives/rash  diphenhydrAMINE 25 milliGRAM(s) Oral every 4 hours PRN Rash and/or Itching  magnesium hydroxide Suspension 30 milliLiter(s) Oral daily PRN Constipation

## 2023-02-21 ENCOUNTER — NON-APPOINTMENT (OUTPATIENT)
Age: 33
End: 2023-02-21

## 2023-02-21 ENCOUNTER — APPOINTMENT (OUTPATIENT)
Dept: OBGYN | Facility: CLINIC | Age: 33
End: 2023-02-21
Payer: COMMERCIAL

## 2023-02-21 ENCOUNTER — ASOB RESULT (OUTPATIENT)
Age: 33
End: 2023-02-21

## 2023-02-21 VITALS
SYSTOLIC BLOOD PRESSURE: 118 MMHG | BODY MASS INDEX: 31.85 KG/M2 | DIASTOLIC BLOOD PRESSURE: 68 MMHG | HEIGHT: 59 IN | WEIGHT: 158 LBS

## 2023-02-21 DIAGNOSIS — Z13.21 ENCOUNTER FOR SCREENING FOR NUTRITIONAL DISORDER: ICD-10-CM

## 2023-02-21 DIAGNOSIS — N76.0 ACUTE VAGINITIS: ICD-10-CM

## 2023-02-21 DIAGNOSIS — Z01.84 ENCOUNTER FOR ANTIBODY RESPONSE EXAMINATION: ICD-10-CM

## 2023-02-21 DIAGNOSIS — Z11.3 ENCOUNTER FOR SCREENING FOR INFECTIONS WITH A PREDOMINANTLY SEXUAL MODE OF TRANSMISSION: ICD-10-CM

## 2023-02-21 PROCEDURE — 76816 OB US FOLLOW-UP PER FETUS: CPT

## 2023-02-21 PROCEDURE — 0502F SUBSEQUENT PRENATAL CARE: CPT

## 2023-02-21 RX ORDER — CLOTRIMAZOLE AND BETAMETHASONE DIPROPIONATE 10; .5 MG/G; MG/G
1-0.05 CREAM TOPICAL TWICE DAILY
Qty: 1 | Refills: 2 | Status: ACTIVE | COMMUNITY
Start: 2023-02-21 | End: 1900-01-01

## 2023-02-23 ENCOUNTER — OUTPATIENT (OUTPATIENT)
Dept: OUTPATIENT SERVICES | Facility: HOSPITAL | Age: 33
LOS: 1 days | End: 2023-02-23
Payer: COMMERCIAL

## 2023-02-23 ENCOUNTER — NON-APPOINTMENT (OUTPATIENT)
Age: 33
End: 2023-02-23

## 2023-02-23 VITALS — HEART RATE: 100 BPM | DIASTOLIC BLOOD PRESSURE: 73 MMHG | SYSTOLIC BLOOD PRESSURE: 122 MMHG

## 2023-02-23 VITALS — TEMPERATURE: 99 F

## 2023-02-23 DIAGNOSIS — O26.899 OTHER SPECIFIED PREGNANCY RELATED CONDITIONS, UNSPECIFIED TRIMESTER: ICD-10-CM

## 2023-02-23 PROCEDURE — 87798 DETECT AGENT NOS DNA AMP: CPT

## 2023-02-23 PROCEDURE — 87491 CHLMYD TRACH DNA AMP PROBE: CPT

## 2023-02-23 PROCEDURE — G0463: CPT

## 2023-02-23 PROCEDURE — 87591 N.GONORRHOEAE DNA AMP PROB: CPT

## 2023-02-23 PROCEDURE — G0378: CPT

## 2023-02-23 PROCEDURE — 87661 TRICHOMONAS VAGINALIS AMPLIF: CPT

## 2023-02-23 PROCEDURE — 83986 ASSAY PH BODY FLUID NOS: CPT

## 2023-02-23 PROCEDURE — 87801 DETECT AGNT MULT DNA AMPLI: CPT

## 2023-02-23 NOTE — OB RN TRIAGE NOTE - NS_OBGYNHISTORY_OBGYN_ALL_OB_FT
Hospitalized @ [facility] X 3 weeks for leaking fluid, was d/c'd on 2/16/23 as per patient

## 2023-02-23 NOTE — OB PROVIDER TRIAGE NOTE - NS_OBGYNHISTORY_OBGYN_ALL_OB_FT
Hospitalized @ [facility] X 3 weeks for leaking fluid, was d/c'd on 2/16/23 as per patient

## 2023-02-23 NOTE — OB PROVIDER TRIAGE NOTE - NSHPPHYSICALEXAM_GEN_ALL_CORE
VS  T(C): 37.2 (02-23-23 @ 14:05)  HR: 100 (02-23-23 @ 15:27)  BP: 122/73 (02-23-23 @ 15:27)  RR: 18 (02-23-23 @ 14:05)  SpO2: 96% (02-23-23 @ 15:26)    Gen: well appearing, NAD   Resp: nl work of breathing   Abd: soft, nontender, gravid    SSE: -pooling, equivocal nitrazine in presence of thick white and scant grey discharge consistent with candida/BV vaginitis. Negative ferning. No bleeding   TAUS: SJ 17

## 2023-02-23 NOTE — OB RN TRIAGE NOTE - HOW OFTEN DO YOU HAVE A DRINK CONTAINING ALCOHOL?
HPI


Chief Complaint:  Respiratory Symptoms


Time Seen by Provider:  20:49


Travel History


International Travel<30 days:  No


Contact w/Intl Traveler<30days:  No


Traveled to known affect area:  No





History of Present Illness


HPI


The patient is 50 year old female who presents to the [facility] emergency 

department with a history of cough, congestion, postnasal drip that began 3 

weeks ago.  She reports that her cough has been productive of clear sputum.  

She reports having nasal congestion without nasal discharge.  She denies having 

any known fevers.  She reports that she was attributing the cough and 

congestion to allergies and has been taking over-the-counter medications for 

it.  She reports that over the last 2 days she then noticed swelling in her 

hands and feet.  She reports that this is happened in the past when she was out 

of her medication, however she has been taking her medications on a regular 

basis.  She reports having shortness of breath with exertion over the last 2-3 

weeks.  On review of systems otherwise, the patient denies having any neck pain

, chest pain, abdominal pain, vomiting, diarrhea, urinary symptoms, or 

neurologic symptoms.





[facility]


Past Medical History


*** Narrative Medical


The patient's past medical history is significant for hyperlipidemia, diabetes 

mellitus, hypertension, history of herniated disc in her cervical spine, 

history of seasonal allergies.


Cardiovascular Problems:  Yes (HTN)


High Cholesterol:  Yes


Diabetes:  Yes


Diminished Hearing:  No


Hypertension:  Yes


Musculoskeletal:  Yes (HERNIATED DISC CERVICAL SPINE)


Immunizations Current:  No


Pregnant?:  Not Pregnant


:  8


Para:  1


Miscarriage:  7


Dilation and Curettage (D&C):  Yes (X3, unknown dates)





Past Surgical History


*** Narrative Surgical


The patient's past surgical history is significant for hernia repair, 

, dilation and curettage 3.


Abdominal Surgery:  Yes (HERNEA REPAIR)


 Section:  Yes (X1 )


Gynecologic Surgery:  Yes ( X 1, )


Other Surgery:  Yes (PREVIOUS CERCLAGE;D& C)





Social History


Alcohol Use:  Yes (OCC)


Tobacco Use:  No


Substance Use:  No





Allergies-Medications


(Allergen,Severity, Reaction):  


Coded Allergies:  


     lisinopril (Unverified  Allergy, Severe, Swelling, 18)


Reported Meds & Prescriptions





Reported Meds & Active Scripts


Active


Reported


Voltaren (Diclofenac Sodium) 1 % Gel..gram. 1 Applic TOPICAL QID PRN


Novolog Inj (Insulin Aspart) 1,000 Unit/10 Ml Vial 10 Units SQ TIDAC


Tresiba Flextouch Pen Inj (Insulin Degludec Inj) 300 unit/3 ML Pen 50 Units SQ 

HS


[sugar blocker]   1 Cap PO TID


Metformin (Metformin HCl) 1,000 Mg Tab 1,000 Mg PO BIDPC


Ibuprofen 800 Mg Tab 800 Mg PO Q8H PRN


Tradjenta (Linagliptin) 5 Mg Tab 5 Mg PO DAILY


Amlodipine (Amlodipine Besylate) 5 Mg Tab 5 Mg PO DAILY


Metoprolol Tartrate 100 Mg Tab 50 Mg PO BID








Review of Systems


Except as stated in HPI:  all other systems reviewed are Neg


General / Constitutional:  No: Fever


Eyes:  No: Visual changes


HENT:  Positive: Congestion, No: Headaches


Cardiovascular:  Positive: Dyspnea on exertion, No: Chest Pain or Discomfort


Respiratory:  Positive: Cough, Shortness of Breath


Gastrointestinal:  No: Nausea, Vomiting, Diarrhea, Abdominal Pain


Genitourinary:  No: Dysuria


Musculoskeletal:  No: Pain


Skin:  No Rash


Neurologic:  No: Weakness, Focal Abnormalities, Change in Mentation, Sensory 

Disturbance


Psychiatric:  No: Depression


Endocrine:  No: Polydipsia


Hematologic/Lymphatic:  No: Easy Bruising





Physical Exam


Narrative


General: 


The patient is a well-developed well-nourished female in no acute distress. 





Head and Neck exam: 


Head is normocephalic atraumatic. 


Eyes: EOMI, pupils are equal round and reactive to light. 


Nose: Midline septum with erythematous edematous nasal mucosa and a clear nasal 

discharge per


Mouth: Dentition unremarkable. Moist mucus membranes. Posterior oropharynx is 

not erythematous. No tonsillar hypertrophy. Uvula midline. Airway patent. 


Neck: No palpable lymphadenopathy. No nuchal rigidity. No thyromegaly. 





Cardiovascular: 


Regular rate and rhythm without murmurs, gallops, or rubs. No pulse deficit to 

the extremities on simultaneous auscultation and palpation of her radial 

artery. 





Lungs: Decreased breath sounds in bilateral lung bases.  No rhonchi, crackles, 

or wheezing.  She has an occasional dry cough on exam.





 


Abdomen:


Soft, without tenderness to palpation in all 4 quadrants of the abdomen. No 

guarding, rebound, or rigidity.  Normal bowel sounds are audible.  No 

tenderness on palpation of McBurney's point.





Extremities: 


No clubbing, cyanosis, or edema. 2+ pulses in all 4 extremities.  No calf 

tenderness on palpation.





Back: 


No spinous process tenderness to palpation. No costovertebral angle tenderness 

to palpation. 





Neurologic Exam: Grossly nonfocal.





Skin Exam: No rash noted. Intact skin that is warm and dry.





Data


Data


Last Documented VS





Vital Signs








  Date Time  Temp Pulse Resp B/P (MAP) Pulse Ox O2 Delivery O2 Flow Rate FiO2


 


18 22:07  83 18 140/88 (105) 99 Room Air  


 


18 18:15 99.5       








Orders





 Orders


Complete Blood Count With Diff (18 18:17)


Basic Metabolic Panel (Bmp) (18 18:17)


B-Type Natriuretic Peptide (18 18:17)


Magnesium (Mg) (18 18:17)


Ckmb (Isoenzyme) Profile (18 18:17)


Troponin I (18 18:17)


Chest, Pa & Lat (18 18:17)


Blood Culture (18 20:55)


Ceftriaxone Inj (Rocephin Inj) (18 21:00)


Azithromycin Inj (Zithromax Inj) (18 21:00)


Potassium Chloride (Kcl) (18 23:00)


Magnesium Oxide (Mag-Ox) (18 23:00)


CKMB (18 21:45)


CKMB% (18 21:45)


Ct Pulmonary Angiogram (18 23:26)


Thyroid Stimulating Hormone (18 21:45)


Ondansetron Inj (Zofran Inj) (18 23:45)


Iohexol 350 Inj (Omnipaque 350 Inj) (18 18:02)


Ed Discharge Order (4/10/18 02:07)





Labs





Laboratory Tests








Test


  18


21:45


 


White Blood Count 7.7 TH/MM3 


 


Red Blood Count 4.35 MIL/MM3 


 


Hemoglobin 12.2 GM/DL 


 


Hematocrit 36.8 % 


 


Mean Corpuscular Volume 84.6 FL 


 


Mean Corpuscular Hemoglobin 28.0 PG 


 


Mean Corpuscular Hemoglobin


Concent 33.0 % 


 


 


Red Cell Distribution Width 14.4 % 


 


Platelet Count 169 TH/MM3 


 


Mean Platelet Volume 9.9 FL 


 


Neutrophils (%) (Auto) 66.2 % 


 


Lymphocytes (%) (Auto) 21.6 % 


 


Monocytes (%) (Auto) 10.8 % 


 


Eosinophils (%) (Auto) 1.0 % 


 


Basophils (%) (Auto) 0.4 % 


 


Neutrophils # (Auto) 5.1 TH/MM3 


 


Lymphocytes # (Auto) 1.7 TH/MM3 


 


Monocytes # (Auto) 0.8 TH/MM3 


 


Eosinophils # (Auto) 0.1 TH/MM3 


 


Basophils # (Auto) 0.0 TH/MM3 


 


CBC Comment DIFF FINAL 


 


Differential Comment  


 


Blood Urea Nitrogen 9 MG/DL 


 


Creatinine 0.71 MG/DL 


 


Random Glucose 161 MG/DL 


 


Calcium Level 8.4 MG/DL 


 


Magnesium Level 1.6 MG/DL 


 


Sodium Level 140 MEQ/L 


 


Potassium Level 3.4 MEQ/L 


 


Chloride Level 106 MEQ/L 


 


Carbon Dioxide Level 27.6 MEQ/L 


 


Anion Gap 6 MEQ/L 


 


Estimat Glomerular Filtration


Rate 106 ML/MIN 


 


 


Total Creatine Kinase 196 U/L 


 


Creatine Kinase MB 2.2 NG/ML 


 


Creatine Kinase MB % 1.1 % 


 


Troponin I


  LESS THAN 0.02


NG/ML


 


B-Type Natriuretic Peptide 20 PG/ML 


 


Thyroid Stimulating Hormone


3rd Gen 0.996 uIU/ML 


 











MDM


Medical Decision Making


Medical Screen Exam Complete:  Yes


Emergency Medical Condition:  Yes


Medical Record Reviewed:  Yes


Interpretation(s)





Last Impressions








Chest X-Ray 18 Signed





Impressions: 





 Service Date/Time:  2018 18:28 - CONCLUSION:  1. Basilar lung 





 consolidation, right greater than left most characteristic of 

bronchopneumonia.  





    Robert Ellis MD 








Differential Diagnosis


Hypoalbuminemia, versus hypothyroid disorder, versus congestive heart failure, 

versus renal failure


Narrative Course


During the course of the patient's emergency department visit, the patient's 

history, examination, and differential diagnosis were reviewed with the 

patient. The patient was placed on a cardiac monitor with oximetry and frequent 

blood pressure monitoring. The patient had  IV access obtained and blood work 

sent for analysis. 





The patient's laboratory studies were reviewed and remarkable for 2, platelets 

169 with monocytes 10.8. basic metabolic profile is remarkable for a potassium 

of 3.4 which was supplemented orally along with magnesium oxide supplemented 

orally, magnesium level 1.6, glucose 161, calcium 8.4, , troponin I less 

than 0.02, BNP 20.





Radiology studies were reviewed and remarkable for a chest x-ray shows basilar 

lung consolidation right greater than the left most characteristic of 

bronchopneumonia.  Blood cultures 2 were ordered.  The patient was started on 

Rocephin 1 g IV, Zithromax 500 IV.  





CTA to rule out PE was ordered.





CTA to rule out PE shows no evidence of pulmonary embolism.  Right middle lobe 

infiltrate/pneumonia is noted.





The patient has remained comfortable throughout her emergency department 

evaluation.  Vital signs have been stable with no supplemental oxygen 

requirements.





The patient will be discharged home with a prescription for Ceftin and 

Zithromax with close follow-up with her primary care physician.





The patient is resting comfortably and feels better, is alert and in no 

distress. The patient's results and examination findings were discussed with 

the patient. The repeat examination is unremarkable and benign. The history, 

exam, diagnostic testing, and current condition do not suggest any significant 

pathology to warrant further testing, continued ED treatment, admission, or 

surgical evaluation at this point. The vital signs have been stable. The 

patient does not have uncontrollable pain, intractable vomiting, or other 

significant symptoms. The patient's condition is stable and appropriate for 

discharge. The patient will pursue further outpatient evaluation with a primary 

care physician or other designated or consulting physician as indicated in the 

discharge instructions. The patient expressed understanding and was agreeable 

with this plan.





Diagnosis





 Primary Impression:  


 Pneumonia


 Qualified Codes:  J18.1 - Lobar pneumonia, unspecified organism


Referrals:  


Primary Care Physician


1 day


Patient Instructions:  Community Acquired Pneumonia (ED), General Instructions


***Med/Other Pt SpecificInfo:  Prescription(s) given


Disposition:  01 DISCHARGE HOME


Condition:  Stable











Sarah Bradley MD 2018 20:54 Never

## 2023-02-23 NOTE — OB PROVIDER TRIAGE NOTE - NSOBPROVIDERNOTE_OBGYN_ALL_OB_FT
34yo  at 28w1d GA presenting with mucousy discharge. Exam significant for discharge consistent in appearance with possible candida/BV. No evidence of ruptured membranes.     - vaginitis panel sent   - d/c home with precautions   - patient to follow up in the office as scheduled, has appt next week     Soraya Jackman MD PGY3   d/w Dr. Khanna

## 2023-02-23 NOTE — OB PROVIDER TRIAGE NOTE - HISTORY OF PRESENT ILLNESS
R3 Triage Note     32yo  at 28w1d GA presenting with vaginal discharge. Patient states that when wiping she noticed stringy, mucousy discharge. Denies any gush of fluid, persistent leaking of fluid, or feeling more wet recently. Patient states that she was recently treated for BV. Since finishing treatment on Monday she has had resolution of her symptoms but has still noticed white, cottage cheese like discharge without itching or burning. Denies VB, CTX, LOF. +FM     Of note, patient was admitted to [facility] with suspected PPROM from 25-27wks. On repeat exam, she was ruled out for ROM with suspected leak that resealed and patient was discharged. She has had no further leaking fluid since discharge.     PNC: s/p admission for PPROM with suspected high leak, now resealsed R3 Triage Note     34yo  at 28w1d GA presenting with vaginal discharge. Patient states that when wiping she noticed stringy, mucousy discharge. Denies any gush of fluid, persistent leaking of fluid, or feeling more wet recently. Patient states that she was recently treated for BV. Since finishing treatment on Monday she has had resolution of her symptoms but has still noticed white, cottage cheese like discharge without itching or burning. Denies VB, CTX, LOF. +FM     Of note, patient was admitted to [facility] with suspected PPROM from 25-27wks. On repeat exam, she was ruled out for ROM with suspected leak that resealed and patient was discharged. She has had no further leaking fluid since discharge.     PNC: s/p admission for PPROM with suspected high leak, now resealed.   OBHx: G1  GynHx: Denies   PMSHx: Denies   Meds: PNV  All: NDKA  Soc: denies

## 2023-02-23 NOTE — OB RN TRIAGE NOTE - FALL HARM RISK - UNIVERSAL INTERVENTIONS
Bed in lowest position, wheels locked, appropriate side rails in place/Call bell, personal items and telephone in reach/Instruct patient to call for assistance before getting out of bed or chair/Non-slip footwear when patient is out of bed/[place] to call system/Physically safe environment - no spills, clutter or unnecessary equipment/Purposeful Proactive Rounding/Room/bathroom lighting operational, light cord in reach

## 2023-02-24 DIAGNOSIS — O99.891 OTHER SPECIFIED DISEASES AND CONDITIONS COMPLICATING PREGNANCY: ICD-10-CM

## 2023-02-24 DIAGNOSIS — N89.8 OTHER SPECIFIED NONINFLAMMATORY DISORDERS OF VAGINA: ICD-10-CM

## 2023-02-24 DIAGNOSIS — Z3A.28 28 WEEKS GESTATION OF PREGNANCY: ICD-10-CM

## 2023-02-26 LAB
A VAGINAE DNA VAG QL NAA+PROBE: SIGNIFICANT CHANGE UP
BVAB2 DNA VAG QL NAA+PROBE: SIGNIFICANT CHANGE UP
C ALBICANS DNA VAG QL NAA+PROBE: POSITIVE
C GLABRATA DNA VAG QL NAA+PROBE: NEGATIVE — SIGNIFICANT CHANGE UP
C KRUSEI DNA VAG QL NAA+PROBE: NEGATIVE — SIGNIFICANT CHANGE UP
C LUSITANIAE DNA VAG QL NAA+PROBE: NEGATIVE — SIGNIFICANT CHANGE UP
C TRACH RRNA SPEC QL NAA+PROBE: NEGATIVE — SIGNIFICANT CHANGE UP
MEGA1 DNA VAG QL NAA+PROBE: SIGNIFICANT CHANGE UP
N GONORRHOEA RRNA SPEC QL NAA+PROBE: NEGATIVE — SIGNIFICANT CHANGE UP
T VAGINALIS RRNA SPEC QL NAA+PROBE: NEGATIVE — SIGNIFICANT CHANGE UP

## 2023-02-27 ENCOUNTER — NON-APPOINTMENT (OUTPATIENT)
Age: 33
End: 2023-02-27

## 2023-02-27 RX ORDER — TERCONAZOLE 4 MG/G
0.4 CREAM VAGINAL DAILY
Qty: 1 | Refills: 0 | Status: ACTIVE | COMMUNITY
Start: 2023-02-27 | End: 1900-01-01

## 2023-03-06 ENCOUNTER — NON-APPOINTMENT (OUTPATIENT)
Age: 33
End: 2023-03-06

## 2023-03-06 ENCOUNTER — APPOINTMENT (OUTPATIENT)
Dept: OBGYN | Facility: CLINIC | Age: 33
End: 2023-03-06
Payer: COMMERCIAL

## 2023-03-06 VITALS
SYSTOLIC BLOOD PRESSURE: 109 MMHG | HEART RATE: 97 BPM | HEIGHT: 59 IN | WEIGHT: 162 LBS | DIASTOLIC BLOOD PRESSURE: 69 MMHG | BODY MASS INDEX: 32.66 KG/M2

## 2023-03-06 DIAGNOSIS — R73.09 OTHER ABNORMAL GLUCOSE: ICD-10-CM

## 2023-03-06 PROCEDURE — 0502F SUBSEQUENT PRENATAL CARE: CPT

## 2023-03-08 ENCOUNTER — ASOB RESULT (OUTPATIENT)
Age: 33
End: 2023-03-08

## 2023-03-08 ENCOUNTER — APPOINTMENT (OUTPATIENT)
Dept: MATERNAL FETAL MEDICINE | Facility: CLINIC | Age: 33
End: 2023-03-08
Payer: COMMERCIAL

## 2023-03-08 PROCEDURE — G0109 DIAB MANAGE TRN IND/GROUP: CPT | Mod: 95

## 2023-03-08 RX ORDER — BLOOD-GLUCOSE METER
W/DEVICE KIT MISCELLANEOUS
Qty: 1 | Refills: 0 | Status: ACTIVE | COMMUNITY
Start: 2023-03-08 | End: 1900-01-01

## 2023-03-08 RX ORDER — URINE ACETONE TEST STRIPS
STRIP MISCELLANEOUS
Qty: 1 | Refills: 2 | Status: ACTIVE | COMMUNITY
Start: 2023-03-08 | End: 1900-01-01

## 2023-03-08 RX ORDER — BLOOD-GLUCOSE METER
KIT MISCELLANEOUS
Qty: 2 | Refills: 0 | Status: ACTIVE | COMMUNITY
Start: 2023-03-08 | End: 1900-01-01

## 2023-03-08 RX ORDER — LANCETS 33 GAUGE
EACH MISCELLANEOUS
Qty: 4 | Refills: 2 | Status: ACTIVE | COMMUNITY
Start: 2023-03-08 | End: 1900-01-01

## 2023-03-16 ENCOUNTER — ASOB RESULT (OUTPATIENT)
Age: 33
End: 2023-03-16

## 2023-03-16 ENCOUNTER — APPOINTMENT (OUTPATIENT)
Dept: MATERNAL FETAL MEDICINE | Facility: CLINIC | Age: 33
End: 2023-03-16
Payer: COMMERCIAL

## 2023-03-16 PROCEDURE — G0108 DIAB MANAGE TRN  PER INDIV: CPT | Mod: 95

## 2023-03-20 ENCOUNTER — NON-APPOINTMENT (OUTPATIENT)
Age: 33
End: 2023-03-20

## 2023-03-21 ENCOUNTER — APPOINTMENT (OUTPATIENT)
Dept: OBGYN | Facility: CLINIC | Age: 33
End: 2023-03-21
Payer: COMMERCIAL

## 2023-03-21 ENCOUNTER — ASOB RESULT (OUTPATIENT)
Age: 33
End: 2023-03-21

## 2023-03-21 VITALS
HEIGHT: 59 IN | SYSTOLIC BLOOD PRESSURE: 126 MMHG | DIASTOLIC BLOOD PRESSURE: 62 MMHG | WEIGHT: 162 LBS | BODY MASS INDEX: 32.66 KG/M2

## 2023-03-21 PROCEDURE — 76816 OB US FOLLOW-UP PER FETUS: CPT

## 2023-03-21 PROCEDURE — 0502F SUBSEQUENT PRENATAL CARE: CPT

## 2023-03-21 PROCEDURE — 76818 FETAL BIOPHYS PROFILE W/NST: CPT

## 2023-03-30 ENCOUNTER — APPOINTMENT (OUTPATIENT)
Dept: MATERNAL FETAL MEDICINE | Facility: CLINIC | Age: 33
End: 2023-03-30
Payer: COMMERCIAL

## 2023-03-30 ENCOUNTER — ASOB RESULT (OUTPATIENT)
Age: 33
End: 2023-03-30

## 2023-03-30 PROCEDURE — G0108 DIAB MANAGE TRN  PER INDIV: CPT | Mod: 95

## 2023-04-03 ENCOUNTER — NON-APPOINTMENT (OUTPATIENT)
Age: 33
End: 2023-04-03

## 2023-04-03 ENCOUNTER — LABORATORY RESULT (OUTPATIENT)
Age: 33
End: 2023-04-03

## 2023-04-03 ENCOUNTER — APPOINTMENT (OUTPATIENT)
Dept: OBGYN | Facility: CLINIC | Age: 33
End: 2023-04-03
Payer: COMMERCIAL

## 2023-04-03 ENCOUNTER — ASOB RESULT (OUTPATIENT)
Age: 33
End: 2023-04-03

## 2023-04-03 DIAGNOSIS — B37.9 CANDIDIASIS, UNSPECIFIED: ICD-10-CM

## 2023-04-03 PROCEDURE — 76818 FETAL BIOPHYS PROFILE W/NST: CPT

## 2023-04-03 PROCEDURE — 36415 COLL VENOUS BLD VENIPUNCTURE: CPT

## 2023-04-03 PROCEDURE — 0502F SUBSEQUENT PRENATAL CARE: CPT

## 2023-04-03 RX ORDER — TERCONAZOLE 80 MG/1
80 SUPPOSITORY VAGINAL
Qty: 7 | Refills: 0 | Status: ACTIVE | COMMUNITY
Start: 2023-04-03 | End: 1900-01-01

## 2023-04-04 ENCOUNTER — NON-APPOINTMENT (OUTPATIENT)
Age: 33
End: 2023-04-04

## 2023-04-04 LAB
BASOPHILS # BLD AUTO: 0.04 K/UL
BASOPHILS NFR BLD AUTO: 0.6 %
EOSINOPHIL # BLD AUTO: 0.06 K/UL
EOSINOPHIL NFR BLD AUTO: 0.8 %
HCT VFR BLD CALC: 34.8 %
HGB BLD-MCNC: 10.4 G/DL
IMM GRANULOCYTES NFR BLD AUTO: 0.6 %
LYMPHOCYTES # BLD AUTO: 1.73 K/UL
LYMPHOCYTES NFR BLD AUTO: 24.5 %
MAN DIFF?: NORMAL
MCHC RBC-ENTMCNC: 24.4 PG
MCHC RBC-ENTMCNC: 29.9 GM/DL
MCV RBC AUTO: 81.5 FL
MONOCYTES # BLD AUTO: 0.68 K/UL
MONOCYTES NFR BLD AUTO: 9.6 %
NEUTROPHILS # BLD AUTO: 4.52 K/UL
NEUTROPHILS NFR BLD AUTO: 63.9 %
PLATELET # BLD AUTO: 309 K/UL
RBC # BLD: 4.27 M/UL
RBC # FLD: 20.2 %
WBC # FLD AUTO: 7.07 K/UL

## 2023-04-11 NOTE — ED ADULT TRIAGE NOTE - HEIGHT IN FEET
4 Staged Advancement Flap Text: Due to geometric and functional constraints, a flap reconstruction was performed to reconstruct the defect.  To that end, adjacent tissue was incised and carried over to close the defect in the following manner: Because of the full-thickness nature of the wound and in order to displace lines around important structures, a Burow???s advancement flap was planned. After prep and local anesthesia, a Burow???s triangle was excised adjacent to the defect.  The other Burow's triangle was displaced to hide incisions within skin relaxation lines. Two flaps were created by undermining in the deep subcutaneous plane. After hemostasis, the flaps were advanced and closed in a layered fashion. This is a staged repair and the patient will return for additional surgery.

## 2023-04-17 ENCOUNTER — APPOINTMENT (OUTPATIENT)
Dept: OBGYN | Facility: CLINIC | Age: 33
End: 2023-04-17
Payer: COMMERCIAL

## 2023-04-17 ENCOUNTER — NON-APPOINTMENT (OUTPATIENT)
Age: 33
End: 2023-04-17

## 2023-04-17 ENCOUNTER — ASOB RESULT (OUTPATIENT)
Age: 33
End: 2023-04-17

## 2023-04-17 VITALS
BODY MASS INDEX: 33.06 KG/M2 | DIASTOLIC BLOOD PRESSURE: 68 MMHG | WEIGHT: 164 LBS | HEIGHT: 59 IN | SYSTOLIC BLOOD PRESSURE: 122 MMHG

## 2023-04-17 PROCEDURE — 76819 FETAL BIOPHYS PROFIL W/O NST: CPT | Mod: 59

## 2023-04-17 PROCEDURE — 76816 OB US FOLLOW-UP PER FETUS: CPT

## 2023-04-17 PROCEDURE — 0502F SUBSEQUENT PRENATAL CARE: CPT

## 2023-04-20 ENCOUNTER — APPOINTMENT (OUTPATIENT)
Dept: MATERNAL FETAL MEDICINE | Facility: CLINIC | Age: 33
End: 2023-04-20
Payer: COMMERCIAL

## 2023-04-20 ENCOUNTER — ASOB RESULT (OUTPATIENT)
Age: 33
End: 2023-04-20

## 2023-04-20 PROCEDURE — G0108 DIAB MANAGE TRN  PER INDIV: CPT | Mod: 95

## 2023-04-24 ENCOUNTER — NON-APPOINTMENT (OUTPATIENT)
Age: 33
End: 2023-04-24

## 2023-04-25 ENCOUNTER — APPOINTMENT (OUTPATIENT)
Dept: OBGYN | Facility: CLINIC | Age: 33
End: 2023-04-25

## 2023-04-26 ENCOUNTER — OUTPATIENT (OUTPATIENT)
Dept: INPATIENT UNIT | Facility: HOSPITAL | Age: 33
LOS: 1 days | Discharge: HOME | End: 2023-04-26
Payer: COMMERCIAL

## 2023-04-26 VITALS — DIASTOLIC BLOOD PRESSURE: 81 MMHG | SYSTOLIC BLOOD PRESSURE: 119 MMHG | HEART RATE: 96 BPM

## 2023-04-26 VITALS
HEART RATE: 93 BPM | DIASTOLIC BLOOD PRESSURE: 91 MMHG | RESPIRATION RATE: 16 BRPM | SYSTOLIC BLOOD PRESSURE: 132 MMHG | TEMPERATURE: 98 F

## 2023-04-26 DIAGNOSIS — O26.899 OTHER SPECIFIED PREGNANCY RELATED CONDITIONS, UNSPECIFIED TRIMESTER: ICD-10-CM

## 2023-04-26 LAB — AMNISURE ROM (RUPTURE OF MEMBRANES): NEGATIVE — SIGNIFICANT CHANGE UP

## 2023-04-26 PROCEDURE — 83986 ASSAY PH BODY FLUID NOS: CPT | Mod: QW

## 2023-04-26 PROCEDURE — 99221 1ST HOSP IP/OBS SF/LOW 40: CPT | Mod: 25

## 2023-04-26 PROCEDURE — 59025 FETAL NON-STRESS TEST: CPT | Mod: 26

## 2023-04-26 NOTE — OB PROVIDER TRIAGE NOTE - HISTORY OF PRESENT ILLNESS
receives her PNC @ [address]  [facility]   34 y/o  @ 37 wks gestation presents with c/o ?PPROM @ 1830 on 2023 states leaking amount 20 cc clear fluid last night denies any LOF since then denies any uc's vb reports +FM denies any n/v/d denies any fever or chills ap care comp by :   hospitalized x's 3 weeks  , PPROM , betamethasone  &   , Magnesium Sulfate   txed yeast infection x's 2   GDMA1       NKA  med hx: denies  surg hx : denies  gyn hx:   hx abnormal pap- +HPV   ob hx: denies  meds:   PNV  FeSO4 Vitamin B  receives [facility] PNC @ [address]  [facility]   32 y/o  @ 37 wks gestation presents with c/o ?PPROM @ 1830 on 2023 states leaking amount 20 cc clear fluid last night denies any LOF since then denies any uc's vb reports +FM denies any n/v/d denies any fever or chills ap care comp by :   hospitalized x's 2 weeks  , PPROM , betamethasone  &   , Magnesium Sulfate   txed yeast infection x's 2   GDMA1       NKA  med hx: denies  surg hx : denies  gyn hx:   hx abnormal pap- +HPV   ob hx: denies  meds:   PNV  FeSO4 Vitamin B

## 2023-04-26 NOTE — OB RN TRIAGE NOTE - CURRENT PREGNANCY COMPLICATIONS, OB PROFILE
PROM hospitalized 2/16/23/Other PROM hospitalized x 3weeks discharged 2/16/23/Other PROM hospitalized x 3weeks discharged 2/16/23/Gestational Diabetes/Other

## 2023-04-26 NOTE — OB RN TRIAGE NOTE - NSNURSINGINSTR_OBGYN_ALL_OB_FT
IUP at 37 weeks with c/o LOF, on evaluation no evidence of SROM, amnisure negative.  Pt d/c to home with labor precautions.  D/C instructions given by Lilly RODRIGUEZ.

## 2023-04-26 NOTE — OB PROVIDER TRIAGE NOTE - ADDITIONAL INSTRUCTIONS
- Terconazole sent to pharmacy  - Patient stable for discharge home with adequate outpatient follow up  - Instructed patient to follow up with OB/GYN within 1 week  - Educated and discussed labor precautions  - Educated and discussed concerning signs/symptoms to call OB/GYN and return to L&D including but not limited to vaginal bleeding, leakage of fluid, painful contractions, decreased fetal movement, fever/chills, shortness of breath, chest pain, rash, difficulty ambulating, nausea/vomiting/diarrhea, worsening of symptoms  - Patient states she understands and agrees with assessment and plan, all questions answered

## 2023-04-26 NOTE — OB PROVIDER TRIAGE NOTE - NSHPPHYSICALEXAM_GEN_ALL_CORE
abdomen: soft, nt on palp  SSE:  mod amt thick white greenish odorless discharge likely c/w yeast   fern- neg  nitrazine negative  amnisure sent   cervix appears closed and posterior   TAS:  BPP: 8/8 vtx posterior placenta SJ: 14.33   T(C): 36.8 (04-26-23 @ 17:36), Max: 36.8 (04-26-23 @ 17:36)  HR: 99 (04-26-23 @ 18:14) (93 - 99)  BP: 117/76 (04-26-23 @ 18:14) (117/76 - 132/91)  RR: 16 (04-26-23 @ 17:36) (16 - 16)  SpO2: -- abdomen: soft, nt on palp  SSE:  mod amt thick white greenish odorless discharge likely c/w yeast   fern- neg  nitrazine negative  amnisure sent   cervix appears closed and posterior   TAS:  BPP: 8/8 vtx posterior placenta SJ: 14.33   T(C): 36.8 (04-26-23 @ 17:36), Max: 36.8 (04-26-23 @ 17:36)  HR: 99 (04-26-23 @ 18:14) (93 - 99)  BP: 117/76 (04-26-23 @ 18:14) (117/76 - 132/91)  RR: 16 (04-26-23 @ 17:36) (16 - 16)  SpO2: --    addendum @ 1840:  cat 1 FHT  toco: irregular

## 2023-04-26 NOTE — OB PROVIDER TRIAGE NOTE - NSOBPROVIDERNOTE_OBGYN_ALL_OB_FT
32 y/o  @ 37 wks gestation r/o PPROM :  awaiting amnisure   continuous EFM 34 y/o  @ 37 wks gestation r/o PPROM :  awaiting amnisure   continuous EFM      Report given to ROSA RODRIGUEZ  - amnisure is negative   - Reactive NST, BPP   - Terconazole sent to pharmacy  - Patient stable for discharge home with adequate outpatient follow up  - Instructed patient to follow up with OB/GYN within 1 week  - Educated and discussed labor precautions  - Educated and discussed concerning signs/symptoms to call OB/GYN and return to L&D including but not limited to vaginal bleeding, leakage of fluid, painful contractions, decreased fetal movement, fever/chills, shortness of breath, chest pain, rash, difficulty ambulating, nausea/vomiting/diarrhea, worsening of symptoms  - Patient states she understands and agrees with assessment and plan, all questions answered  - Discussed with Dr. Pena   - Patient discharged at **    - terozal 3 apply per vagina nightly for 3 nights 0.8% sent to [facility] 32 y/o  @ 37 wks gestation r/o PPROM :  awaiting amnisure   continuous EFM    Report given to ROSA RODRIGUEZ  - Amnisure is negative, no evidence of PROM at this time   - Reactive NST, BPP   - Terconazole sent to pharmacy for yeast infection   - Vaginal culture sent, will call patient with results  - Patient stable for discharge home with adequate outpatient follow up  - Instructed patient to follow up with OB/GYN within 1 week  - Educated and discussed labor precautions  - Educated and discussed concerning signs/symptoms to call OB/GYN and return to L&D including but not limited to vaginal bleeding, leakage of fluid, painful contractions, decreased fetal movement, fever/chills, shortness of breath, chest pain, rash, difficulty ambulating, nausea/vomiting/diarrhea, worsening of symptoms  - Patient states she understands and agrees with assessment and plan, all questions answered  - Discussed with Dr. Pena   - Patient discharged at 08:10pm

## 2023-04-26 NOTE — OB PROVIDER TRIAGE NOTE - NS_DISPOSITION_OBGYN_ALL_OB
Left message on patient's voicemail with writer's contact information requesting a call back.  Will await return call from patient.     Continue to Observe Discharge

## 2023-04-26 NOTE — OB RN TRIAGE NOTE - NS_OBGYNHISTORY_OBGYN_ALL_OB_FT
Hospitalized @ [facility] X 3 weeks for leaking fluid, was d/c'd on 2/16/23 as per patient Hospitalized @ [facility] X 3 weeks for leaking fluid, was d/c'd on 2/16/23 as per patient    abnl pap Hospitalized @ [facility] X 3 weeks for leaking fluid, was d/c'd on 2/16/23 as per patient    abnl pap    yeast infection in march and April 2023

## 2023-04-27 DIAGNOSIS — O24.410 GESTATIONAL DIABETES MELLITUS IN PREGNANCY, DIET CONTROLLED: ICD-10-CM

## 2023-04-27 DIAGNOSIS — Z3A.37 37 WEEKS GESTATION OF PREGNANCY: ICD-10-CM

## 2023-04-27 DIAGNOSIS — B37.9 CANDIDIASIS, UNSPECIFIED: ICD-10-CM

## 2023-04-27 DIAGNOSIS — O98.813 OTHER MATERNAL INFECTIOUS AND PARASITIC DISEASES COMPLICATING PREGNANCY, THIRD TRIMESTER: ICD-10-CM

## 2023-04-28 LAB
CULTURE RESULTS: SIGNIFICANT CHANGE UP
SPECIMEN SOURCE: SIGNIFICANT CHANGE UP

## 2023-05-02 ENCOUNTER — NON-APPOINTMENT (OUTPATIENT)
Age: 33
End: 2023-05-02

## 2023-05-02 ENCOUNTER — APPOINTMENT (OUTPATIENT)
Dept: OBGYN | Facility: CLINIC | Age: 33
End: 2023-05-02
Payer: COMMERCIAL

## 2023-05-02 VITALS
SYSTOLIC BLOOD PRESSURE: 124 MMHG | WEIGHT: 166 LBS | BODY MASS INDEX: 33.47 KG/M2 | DIASTOLIC BLOOD PRESSURE: 74 MMHG | HEIGHT: 59 IN

## 2023-05-02 PROCEDURE — 0502F SUBSEQUENT PRENATAL CARE: CPT

## 2023-05-08 ENCOUNTER — NON-APPOINTMENT (OUTPATIENT)
Age: 33
End: 2023-05-08

## 2023-05-08 LAB — B-HEM STREP SPEC QL CULT: ABNORMAL

## 2023-05-09 ENCOUNTER — APPOINTMENT (OUTPATIENT)
Dept: OBGYN | Facility: CLINIC | Age: 33
End: 2023-05-09
Payer: COMMERCIAL

## 2023-05-09 ENCOUNTER — NON-APPOINTMENT (OUTPATIENT)
Age: 33
End: 2023-05-09

## 2023-05-09 PROCEDURE — 0502F SUBSEQUENT PRENATAL CARE: CPT

## 2023-05-10 ENCOUNTER — OUTPATIENT (OUTPATIENT)
Dept: INPATIENT UNIT | Facility: HOSPITAL | Age: 33
LOS: 1 days | Discharge: HOME | End: 2023-05-10
Payer: COMMERCIAL

## 2023-05-10 VITALS
TEMPERATURE: 98 F | RESPIRATION RATE: 17 BRPM | DIASTOLIC BLOOD PRESSURE: 79 MMHG | SYSTOLIC BLOOD PRESSURE: 120 MMHG | HEART RATE: 103 BPM

## 2023-05-10 DIAGNOSIS — O26.899 OTHER SPECIFIED PREGNANCY RELATED CONDITIONS, UNSPECIFIED TRIMESTER: ICD-10-CM

## 2023-05-10 PROCEDURE — 99221 1ST HOSP IP/OBS SF/LOW 40: CPT | Mod: 25

## 2023-05-10 PROCEDURE — 59025 FETAL NON-STRESS TEST: CPT | Mod: 26

## 2023-05-10 NOTE — OB RN TRIAGE NOTE - FALL HARM RISK - CONCLUSION
[FreeTextEntry1] : Rosamaria Mcdowell is a 71 year old F with a PMHx of Parkinson's disease, lung cancer (on targeted therapy), hx cataract surgery, history of breast cancer, GERD, thyroid nodules and tachycardia who presents for follow up in the [facility] at [place] for Parkinson's disease via telehealth. Positive Kranthi scan. She is accompanied by her  who provides collateral history.\par \par Interval history:\par Since the last visit, she is able to walk a straight line. No other episodes of passing out. She has not done EEG or CUS. Her  was diagnosed bladder cancer mets to the brain, had surgery, but doing well. \par She is having a bowel movement daily. \par No dizziness/lightheadedness. \par No hallucinations.\par She has dreams at night. Occasional screaming/yelling.\par Occasional dyskinesias.\par She is doing online crossword puzzles, and her typing is better, and she is practicing her PT.\par She has a history of vocal cord paralysis on the left 5 years. She had a filler with Dr. Rodriguez, and it worked for 2 years. They are debating whether to do that because the last injection only lasted 3 days.\par \par Current meds:\par Sinemet 25-100mg ER 2 tabs at 7a-2p-10p and 1 tab at 10a and 6p\par Pramipexole 0.375mg IR in the evening\par Miralax and Fiber\par Parcopa - using this for OFF periods once a day \par Mirtazapine 7.5mg at bedtime\par Ativan 0.5mg BID PRN\par \par Prior meds:\par Azilect 1 mg daily\par Melatonin gave her palpitations\par Modafinil did not help her fatigue\par Dexamethasone for the fatigue kept her up\par Olanzapine and her parkinsonism became worse\par Lyrica was stopped for urinary incontinence\par Zoloft\par Lexapro - she had increased tremors\par Xanax\par Inbrija 42mg 4 times a day - has not been using\par Prozac 40mg daily\par Seroquel 50mg TID - 10a-6p-10p\par \par Initial history:\par She was diagnosed in 2014 at [facility]. She has been seeing a general neurologist.  \par \par She thought she had essential tremor. Her tremors are mostly in the right hand, controlled on Sinemet. Her left leg has restless leg syndrome.\par She feels worse in the afternoon, around 3-4pm. She gets wobbly. If she takes her BP, it is lower. She has not been officially tested for orthostatic hypotension. Sometimes her systolic is in 100s. She was tried on midodrine but she had hair loss and UTI. She was also tried on Gabapentin. When she didn't take the Sinemet ER in the morning she felt worse. No trouble with buttons, zippers or shoelaces, cutting food. No significant stiffness. She gets muscle spasms in the feet. She thinks the Sinemet lasts about 4 hours.  \par \par No trouble turning or adjusting in bed at night.\par She talks in her sleep and has fallen out of bed before. She wakes up to go to the bathroom at night. She also has breakthrough tremors at night. \par She urinates every 4 hours. \par \par She was reduced off the pramipexole due to potential side effects, but she is not sure which ones. When she decreased it, around 10p for about an hour she would be walking up and down the halls and could not lay still and got spasms in her left leg. She was tried on Lyrica without relief. They tried adjusting the Sinemet doses instead.\par \par She has no sense of smell.\par No changes in loudness of her voice. She had unilateral paralysis of her vocal cord. Therapy did not work. She can still sing.\par No trouble swallowing. \par She has constipation. She has a bowel movement every couple of days. She takes Miralax daily or every other day. She has increased her water intake.\par She has urinary frequency. No incontinence. \par Memory, having more trouble finding words.\par Mood is depressed, but generally okay.\par Dizziness in the afternoon when standing. No falls. Her BP is sometimes low. \par Hallucinations, none.\par \par Current meds:\par Sinemet 10-100mg taking 6 tabs daily - 7a-11-3-7-11-3a\par Sinemet ER 25-100mg 1 tab BID 7a-7p\par Pramipexole 0.75mg ER in the evening\par \par Prior meds:\par Azilect 1 mg daily\par \par Family history:\par Social history: retired nurse\par  \par No Kranthi scan.\par MRI brain with and without contrast was done in 4/2019.
Universal Safety Interventions

## 2023-05-10 NOTE — OB RN TRIAGE NOTE - NS_OBGYNHISTORY_OBGYN_ALL_OB_FT
Hospitalized @ [facility] X 3 weeks for leaking fluid, was d/c'd on 2/16/23 as per patient    abnl pap    yeast infection in march and April 2023

## 2023-05-11 VITALS — DIASTOLIC BLOOD PRESSURE: 78 MMHG | HEART RATE: 89 BPM | SYSTOLIC BLOOD PRESSURE: 113 MMHG

## 2023-05-11 DIAGNOSIS — O24.410 GESTATIONAL DIABETES MELLITUS IN PREGNANCY, DIET CONTROLLED: ICD-10-CM

## 2023-05-11 DIAGNOSIS — O36.8330 MATERNAL CARE FOR ABNORMALITIES OF THE FETAL HEART RATE OR RHYTHM, THIRD TRIMESTER, NOT APPLICABLE OR UNSPECIFIED: ICD-10-CM

## 2023-05-11 DIAGNOSIS — Z3A.39 39 WEEKS GESTATION OF PREGNANCY: ICD-10-CM

## 2023-05-11 DIAGNOSIS — O47.1 FALSE LABOR AT OR AFTER 37 COMPLETED WEEKS OF GESTATION: ICD-10-CM

## 2023-05-11 NOTE — OB PROVIDER TRIAGE NOTE - ADDITIONAL INSTRUCTIONS
no evidence active labor at this time  follow up with OB provider as scheduled.  increase fluid hydration.  reviewed fetal kick count and labor precautions  call OB/return to triage with change in symptoms and or concerns such as decreased fetal movement, leakage of fluid, vaginal bleeding, contractions, pain.

## 2023-05-11 NOTE — OB PROVIDER TRIAGE NOTE - PLAN OF CARE
no evidence active labor at this time  follow up with OB provider as scheduled.  increase fluid hydration.  reviewed fetal kick count and labor precautions  call OB/return to triage with change in symptoms and or concerns such as decreased fetal movement, leakage of fluid, vaginal bleeding, contractions, pain.  addressed questions and concerns  reviewed labor precautions and discharge papers. pt verbalized understanding and signed.

## 2023-05-11 NOTE — OB PROVIDER TRIAGE NOTE - NS_DILATION_OBGYN_ALL_OB_NU
What Type Of Note Output Would You Prefer (Optional)?: Bullet Format How Severe Is Your Skin Lesion?: mild Has Your Skin Lesion Been Treated?: not been treated Is This A New Presentation, Or A Follow-Up?: Skin Lesion Additional History: White area on neck, present aprox a month 0.5

## 2023-05-11 NOTE — OB PROVIDER TRIAGE NOTE - NSHPPHYSICALEXAM_GEN_ALL_CORE
Vital Signs Last 24 Hrs  T(C): 36.6 (10 May 2023 23:40), Max: 36.9 (10 May 2023 23:40)  T(F): 97.9 (10 May 2023 23:40), Max: 98.42 (10 May 2023 23:40)  HR: 89 (11 May 2023 00:41) (89 - 112)  BP: 113/78 (11 May 2023 00:41) (112/77 - 120/79)  BP(mean): --  RR: 15 (10 May 2023 23:40) (15 - 17)  SpO2: --    Parameters below as of 10 May 2023 23:40  Patient On (Oxygen Delivery Method): room air    gen: a/o x 3, appears comfortable   pulm: breathing unlabored on room air  abd: soft and nontender, gravid  SVE: FT/70/-3. no evidence LOF/VB at external genitalia  TAS: vertex by sono. posterior placenta, SJ 17.53, BPP 8/8. image saved  EFM: baseline 145 with mod variability, pos accels- reactive. late decel @ 20:15 while I was examining pt. d/w Dr Pena, continue to monitor x 30 min  TOCO: xxxxx contractions

## 2023-05-11 NOTE — OB PROVIDER TRIAGE NOTE - NSOBPROVIDERNOTE_OBGYN_ALL_OB_FT
33 year old female  @ 39.1GA with SLIUP presents to r/o labor.   FT/70/-3, intact  BPP 8/8  late decel @ 20:15 while I was examining pt. d/w Dr Pena, continue to monitor x 30 min 33 year old female  @ 39.1GA with SLIUP presents to r/o labor.   FT/70/-3, intact  BPP 8/8  late decel @ 20:15 while I was examining pt. d/w Dr Pena, continue to monitor x 30 min  @1:05am, tracing reviewed with Dr Pena: discharge home    no evidence active labor at this time  follow up with OB provider as scheduled.  increase fluid hydration.  reviewed fetal kick count and labor precautions  call OB/return to triage with change in symptoms and or concerns such as decreased fetal movement, leakage of fluid, vaginal bleeding, contractions, pain.  addressed questions and concerns  reviewed labor precautions and discharge papers. pt verbalized understanding and signed.    discharged @ 1:15am

## 2023-05-11 NOTE — OB PROVIDER TRIAGE NOTE - NS_OBGYNHISTORY_OBGYN_ALL_OB_FT
Hospitalized @ [facility] X 3 weeks for leaking fluid, was d/c'd on 2/16/23 as per patient    abnl pap    yeast infection in march and April 2023

## 2023-05-11 NOTE — OB PROVIDER TRIAGE NOTE - HISTORY OF PRESENT ILLNESS
33 year old female  @ 39.1GA with SLIUP presents to r/o labor. reports contractions q 5-8 minutes rated at 8/10 in pain. reports GFM. denies VB, LOF, HA, N/V/D, fever, chills.  PNC with Dr Hermosillo ([facility]), apt 23, 0cm dilated  EFW 6# 2 weeks ago  GBS pos 23  PNC complicated by PPROM @ 26 weeks - admitted, betamethasone, Abx. resolved/resealed - discharged home.         -borderline GDMA1, as of 2 weeks ago pt no longer monitoring glucose levels as per Dr Lambert BAKER, treated. yeast infection.  Scheduled IOL 23    med hx: denies  surg hx: denies   Ob: primigravida  Gyn: OLIVIA. Chlamydia 2011. denies hx fibroids, abnormal pap smears, cysts, other STDs, HSV  Allergies: NKDA  Medications: PNV  Psych: denies  Social: denies alc/drugs/tobacco

## 2023-05-12 ENCOUNTER — TRANSCRIPTION ENCOUNTER (OUTPATIENT)
Age: 33
End: 2023-05-12

## 2023-05-12 ENCOUNTER — INPATIENT (INPATIENT)
Facility: HOSPITAL | Age: 33
LOS: 3 days | Discharge: HOME | End: 2023-05-16
Attending: SPECIALIST | Admitting: SPECIALIST
Payer: COMMERCIAL

## 2023-05-12 VITALS — SYSTOLIC BLOOD PRESSURE: 124 MMHG | HEART RATE: 78 BPM | DIASTOLIC BLOOD PRESSURE: 82 MMHG

## 2023-05-12 DIAGNOSIS — O26.899 OTHER SPECIFIED PREGNANCY RELATED CONDITIONS, UNSPECIFIED TRIMESTER: ICD-10-CM

## 2023-05-12 NOTE — OB PROVIDER TRIAGE NOTE - NSOBPROVIDERNOTE_OBGYN_ALL_OB_FT
34 y/o  GDMA1 at 39.2 weeks with contractions every 3-6 minutes  - 2cm dilated, not in active labor  - Late decel on NST, continue monitoring for another 1 hr  - BPP     - PPROM @ 23:48, VE unchanged /-3  - Admit to L&D for induction with buccal cytotec   - Vertex presentation  - GBS negative   - Admission labs done  - Consent forms signed  - Case discussed with Dr. Soraya Jackman PGY-3 and Dr. Bennett 34 y/o  GDMA1 at 39.2 weeks with contractions every 3-6 minutes  - 2cm dilated, not in active labor  - Late decel on NST, continue monitoring for another 1 hr  - BPP     - PROM @ 23:48, VE unchanged /-3  - Admit to L&D for induction with buccal cytotec   - Vertex presentation  - GBS negative   - Admission labs done  - Consent forms signed  - Case discussed with Dr. Soraya Jackman PGY-3 and Dr. Bennett

## 2023-05-12 NOTE — OB RN TRIAGE NOTE - FALL HARM RISK - UNIVERSAL INTERVENTIONS
Bed in lowest position, wheels locked, appropriate side rails in place/Call bell, personal items and telephone in reach/Instruct patient to call for assistance before getting out of bed or chair/Non-slip footwear when patient is out of bed/Reading to call system/Physically safe environment - no spills, clutter or unnecessary equipment/Purposeful Proactive Rounding/Room/bathroom lighting operational, light cord in reach

## 2023-05-12 NOTE — OB PROVIDER TRIAGE NOTE - HISTORY OF PRESENT ILLNESS
34 y/o [id number] GDMA1 at 39.2 weeks presents with contractions every 4-5 minutes.   PNC complicated by PPROM - hospitalized x's 2 weeks , given betamethasone  & , given Magnesium Sulfate  txed yeast infection x's 2 34 y/o [id number] GDMA1 at 39.2 weeks presents with contractions every 4-5 minutes. Denies leakage of clear fluid or vaginal bleeding.  PNC complicated by PPROM at 24 weeks - hospitalized x 2 weeks , given betamethasone  & , given Magnesium Sulfate. Membranes resealed 34 y/o  GDMA1 at 39.2 weeks presents with contractions every 4-5 minutes. Denies leakage of clear fluid or vaginal bleeding.  PNC complicated by PPROM at 24 weeks - hospitalized x 2 weeks , given betamethasone  & , given Magnesium Sulfate. Membranes resealed

## 2023-05-12 NOTE — OB RN TRIAGE NOTE - ABORTIONS, OB PROFILE
Supportive follow up visit on [facility] after wife, Jessie Sotelo, was upset yesterday with what she perceived to be a setback. This morning, Carlos Dominguez is seated upright in his bed, well-groomed and looking better than I have seen him before. He actually managed a smile during the visit. Jessie Sotelo is much more relaxed than yesterday, having had just spoken with the doctor who assured her Carlos Dominguez would not be discharged until medically appropriate. This seems to have eased her concerns. She reminded Carlos Dominguez I am the  (though he himself is not [employer]) and that there were many people praying for his recovery. Will follow as needed/able. SHAWNA Lara, [facility],  [facility]  Paging Service  [phone]

## 2023-05-12 NOTE — OB PROVIDER TRIAGE NOTE - GRAVIDA, OB PROFILE
[FreeTextEntry1] : (1) Renal Transplant.  The BUN/creatinine is as follows:  34/1.8 (05/2016), 26/1.70 (01/2017), 30/1.71 (03/2018), 34/2.22 (08/2018), 36/2.0 (09/2018), 25/2.2 (02/2019),  37/2.13 (07/17/2019), and most recently 32/2.02 (11/11/2019).   The urine protein to creatinine ratio remains in the 1.5 grams region(  1510 mg/g --> 1411 mg/g).  BK virus is negative.  The serum creatinine has trended up slowly over the past few years.  \par \par (2) Hypertension.  Well controlled.\par \par (3) Dyslipidemia. LDL 98 mg/dL, HDL 38 mg/dL,  mg/dL, total cholesterol 190 mg/dL.\par \par (4) Squamous cell carcinoma.  \par \par (5) Hypercalcemia (calcium 10.8 mg/dL, serum albumin 3.3 g/dL)  The last iPTH was 43 pg/cc.  The 1,2 5 dihydroxy vitamin D level is 22.4 pg/cc, 25 hydroxy vitamin D 29.9 ng/cc, TSH 1.170, The SPEP and urine immunofixation studies are negative.   Mr. Bates continues to take Tums at times. \par \par (6) Recall on ranitidine.\par \par (7) Proteinuria.  The workup was negative. \par \par PLAN:\par \par (1) Discontinue ranitidine.  Start Protonix 30 mg PO once daily. This should decrease the need for Rolaids.  Take around 30 minutes prior to the largest meal of the day.\par (2) Given that squamous cell carcinoma has been known to metastasize in renal transplant patients, I strongly recommend that Mr. Bates see dermatology every 3 months.  I will forward this letter to Dr. Aaron Falk.\par (3) Continue the current regimen.\par (4) Repeat the lab studies in 3 month.\par (5) I made no changes to the medication regimen. \par \par  1

## 2023-05-12 NOTE — OB PROVIDER TRIAGE NOTE - NSHPPHYSICALEXAM_GEN_ALL_CORE
VS  T(C): 36.8 (05-12-23 @ 22:02)  HR: 96 (05-12-23 @ 22:22)  BP: 111/72 (05-12-23 @ 22:22)  RR: 16 (05-12-23 @ 22:02)  SpO2: --    A&O x 3  Lungs: CTAB  Abd: gravid, soft nontender to palpation  EFM: baseline 145, moderate variability, + accels, late decel, ctx q 3-6 min  SVE: 2/80/-3  TAS: vertex presentation, posterior placenta, BPP 8/8 SJ 17.93

## 2023-05-12 NOTE — OB RN TRIAGE NOTE - NS_TRIAGETIMEOFMEDICALSCREENING_OBGYN_ALL_OB_DT
May 1, 2018     MD Bowen Ramos MD    RE: Abel Stone   :  1938     Dear Wayne Oshea MD,    It was an absolute pleasure to see Abel Stone in the [facility] on 2018. Please see the relevant portions of my note below.     CHIEF COMPLAINT:  Dyspnea on exertion    HISTORY OF PRESENT ILLNESS:Abel Stone is a 79 year old male who presents to the clinic with the following CV (cardiovascular) history:    #1 CAD s/p CABG  - Negative Nuc MPI 2017 - EF 69%  #2 Chronic RV Failure + Mod - Sev TR  #3 Pulmonary Hypertension - Etiology likely mixed 2/2 shunt + COPD  #4 Chronic Atrial Fibrillation  #5 Right to left intracardiac shunt  #6 Right pulmonary nodule being followed by pulmonologist Dr Duque  #7 Severe symptomatic anemia (iv iron and PRBC transfusions under the care of heme/onc Dr DAYAN Almazan)     Since last visit:    He is accompanied by his daughter.    Upon detailed questioning today, he denies any chest pain or pressure. He denies any dizziness. He reports a fall approximately 2 weeks ago, but denies any loss of consciousness. He denies shortness of breath at rest. He reports dyspnea on exertion that is prominent to him after a distance of 50 feet. He cannot climb flight of stairs without stopping. He denies orthopnea. He denies paroxysmal nocturnal dyspnea. He reports bilateral lower committee swelling that is much improved with regular diuretic therapy. He has not been strictly following a daily salt restriction. He follows a daily fluid restriction. He has lost approximately 40 pounds over the last 6 months, mostly with regular intake of diuretic therapy. He reports his Bumex takes effect within 45 minutes, and the effect lasts for approximately 4-5 hours.     DIAGNOSTICS:  The following diagnostics were reviewed.    RHC 2018:  RA .  RV 63/0/12.  PA 6120/30.  PCWP 15/12/10.  Systemic BP 15/12/10  HR 87  bpm  Cardiac output/cardiac index (thermodilution) 4.65/2.20.  Cardiac output/cardiac index ( Assumed Carlos Alberto) = 3.81/1.80.    MRI 4/18/2018:  IMPRESSION:  1.  No evidence of cardiac amyloidosis. Borderline elevated ECV 26-30%. No  additional myocardial fibrosis aside from insertional enhancement. No  myocardial edema.  2.  T2* ranging 30-45 ms, indicating underlying restrictive cardiomyopathy  is unlikely to be related to iron deposition.  3.  Severe right ventricular hypertrophy with dilation and severe systolic  dysfunction, compatible with known pulmonary hypertension. Significantly  systolic and early diastolic septal bowing and bounds, compatible with both  right ventricular volume and pressure overload.  4.  Severe biatrial dilation with mild tricuspid and mitral regurgitation,  compatible with known significant. Diastolic dysfunction.  5.  Known chronic right pleural effusion with pleural thickening and  overlying round atelectasis. There are also fibrotic changes at the lung  bases. On review of prior chest CT, these findings may all be related to  chronic aspiration.    ECHO 12/15/2017  Normal left ventricular systolic function.  Mildly increased left ventricular wall thickness.  Grade III/IV diastolic dysfunction (restrictive filling pattern), severely elevated filling pressures.  Flattened septum in diastole consistent with right ventricle volume overload.  Moderately increased right ventricular size.Normal right ventricular systolic function.  Mildly increased left atrial size.  Moderately increased right atrial size.  Mild mitral valve regurgitation.  Moderate-to-severe eccentric jet of tricuspid valve regurgitation.  Right ventricular systolic pressure 76 mmHg.  Compared to prior study tricuspid regurgitation is increased, RV pressures have increased.    NST 12/19/2017  Myocardial perfusion scan reveals no significant areas of ischemia (minor anteroapical reversibility), EF is 69  %.    Non-Cardiac:    PFTs 01/2017:  Conclusions:  1. Moderate to severe expiratory airflow obstruction with excellent response to nebulized albuterol consistent with reversible airflow obstruction. Consider a component of asthma.  2. Lung volumes reveal pulmonary hyperinflation and air trapping consistent with obstruction. Consider emphysema.  3. Severe reduction in diffusion is consistent with anemia, elevated carboxyhemoglobin level, pulmonary interstitial and/or vascular disease. Consider emphysema and pulmonary fibrosis.  4. Normal oxygen saturation on room air at rest.    CT 12/2017:  IMPRESSION:   1. No evidence of pulmonary embolism.  2. Moderate loculated chronic appearing right pleural effusion with associated right pleural thickening.  3. Prominent cardiomegaly.  4. Masslike density within the right lower lung with numerous calcifications. Majority of this is likely related to chronic atelectasis as there is significant volume loss to the right lower lung. This also demonstrates numerous calcifications likely related to pulmonary ossification. There is a smaller more bandlike area of similar appearing density at the left lung base with associated calcifications also noted. Possibility of underlying mass or tumor would not be completely excluded but less likely.  5. Reticular nodular interstitial densities involving the left lung.  FINDINGS may relate to underlying chronic interstitial lung disease process versus other etiology such as sarcoidosis or mycobacterium avium infection. There is some associated left pleural thickening.  6. 15 mm irregular groundglass pulmonary nodule posterior right upper lung. This is indeterminant. Per Fleischner Society recommendations for subsequent pulmonary nodules, consider CT chest follow-up in 3 months.    ASSESSMENT/PLAN:  Pulmonary Arterial Hypertension + Chronic RV Failure: ACC/ AHA Stage C, New York Heart Association Classification: NYHA Class III: Significant HF  symptoms/activity intolerance (Only able to do light housework, can walk slowly on level ground)    Volume status is euvolemic - RA 6 mmHg on cath today.   Perfusion status is low.   Labs Reviewed.    Based on objective data and clinical data will augment medical therapy as follows:  OMT (optimal medical therapy): Adding Adcirca at 20 mg daily. No other changes. Patient to follow up with Bowen Steve in 4 weeks.  ICD (implantable cardioverter defibrillator): None, not indicated  Teaching: as per [facility] program    -Reviewed RHC and CMR - no evidence of infiltrative cardiomyopathy.  -RHC numbers consistent with PAH, probably mixed (Shunt + h/o COPD).  -PAH work up: Sleep study recently done per patient, CT PE negative in 12/2017, PFTs done 01/2017. Will order V/Q if desired per Dr Steve.  -Adding Adcirca 20 mg daily + follow up with Dr Steve in 4 weeks.  -Continue the rest of his medications unchanged.  -Agree with palliative care discussions in the future if he does poorly with PO vasodilators.    Follow-up:  Clinic: Dr Bowen Steve in 4 weeks  Testing: CMP, BNP, will order V/Q after discussions with Dr Steve (CTPE was negative)    Thank you for allowing us to participate in your patient's care. Please do not hesitate to contact me with questions.    Sincerely,    Erasto Teixeira MD   [facility]  [address] [facility] [address]  Phone: [phone]  Fax: [phone]    CC:    Bowen Steve MD  [address] [address]  VIA In Basket         13-May-2023 22:20

## 2023-05-13 DIAGNOSIS — O42.90 PREMATURE RUPTURE OF MEMBRANES, UNSPECIFIED AS TO LENGTH OF TIME BETWEEN RUPTURE AND ONSET OF LABOR, UNSPECIFIED WEEKS OF GESTATION: ICD-10-CM

## 2023-05-13 LAB
BASOPHILS # BLD AUTO: 0.04 K/UL — SIGNIFICANT CHANGE UP (ref 0–0.2)
BASOPHILS NFR BLD AUTO: 0.5 % — SIGNIFICANT CHANGE UP (ref 0–2)
BLD GP AB SCN SERPL QL: NEGATIVE — SIGNIFICANT CHANGE UP
COVID-19 SPIKE DOMAIN AB INTERP: POSITIVE
COVID-19 SPIKE DOMAIN ANTIBODY RESULT: >250 U/ML — HIGH
EOSINOPHIL # BLD AUTO: 0.11 K/UL — SIGNIFICANT CHANGE UP (ref 0–0.5)
EOSINOPHIL NFR BLD AUTO: 1.3 % — SIGNIFICANT CHANGE UP (ref 0–6)
HCT VFR BLD CALC: 38.5 % — SIGNIFICANT CHANGE UP (ref 34.5–45)
HGB BLD-MCNC: 12.2 G/DL — SIGNIFICANT CHANGE UP (ref 11.5–15.5)
IANC: 5.45 K/UL — SIGNIFICANT CHANGE UP (ref 1.8–7.4)
IMM GRANULOCYTES NFR BLD AUTO: 0.3 % — SIGNIFICANT CHANGE UP (ref 0–0.9)
LYMPHOCYTES # BLD AUTO: 2.21 K/UL — SIGNIFICANT CHANGE UP (ref 1–3.3)
LYMPHOCYTES # BLD AUTO: 25.7 % — SIGNIFICANT CHANGE UP (ref 13–44)
MCHC RBC-ENTMCNC: 24.5 PG — LOW (ref 27–34)
MCHC RBC-ENTMCNC: 31.7 GM/DL — LOW (ref 32–36)
MCV RBC AUTO: 77.5 FL — LOW (ref 80–100)
MONOCYTES # BLD AUTO: 0.76 K/UL — SIGNIFICANT CHANGE UP (ref 0–0.9)
MONOCYTES NFR BLD AUTO: 8.8 % — SIGNIFICANT CHANGE UP (ref 2–14)
NEUTROPHILS # BLD AUTO: 5.45 K/UL — SIGNIFICANT CHANGE UP (ref 1.8–7.4)
NEUTROPHILS NFR BLD AUTO: 63.4 % — SIGNIFICANT CHANGE UP (ref 43–77)
NRBC # BLD: 0 /100 WBCS — SIGNIFICANT CHANGE UP (ref 0–0)
NRBC # FLD: 0 K/UL — SIGNIFICANT CHANGE UP (ref 0–0)
PLATELET # BLD AUTO: 273 K/UL — SIGNIFICANT CHANGE UP (ref 150–400)
RBC # BLD: 4.97 M/UL — SIGNIFICANT CHANGE UP (ref 3.8–5.2)
RBC # FLD: 18.1 % — HIGH (ref 10.3–14.5)
RH IG SCN BLD-IMP: POSITIVE — SIGNIFICANT CHANGE UP
SARS-COV-2 IGG+IGM SERPL QL IA: >250 U/ML — HIGH
SARS-COV-2 IGG+IGM SERPL QL IA: POSITIVE
T PALLIDUM AB TITR SER: NEGATIVE — SIGNIFICANT CHANGE UP
WBC # BLD: 8.6 K/UL — SIGNIFICANT CHANGE UP (ref 3.8–10.5)
WBC # FLD AUTO: 8.6 K/UL — SIGNIFICANT CHANGE UP (ref 3.8–10.5)

## 2023-05-13 PROCEDURE — 59510 CESAREAN DELIVERY: CPT

## 2023-05-13 RX ORDER — SODIUM CHLORIDE 9 MG/ML
300 INJECTION, SOLUTION INTRAVENOUS ONCE
Refills: 0 | Status: DISCONTINUED | OUTPATIENT
Start: 2023-05-13 | End: 2023-05-13

## 2023-05-13 RX ORDER — ONDANSETRON 8 MG/1
4 TABLET, FILM COATED ORAL EVERY 6 HOURS
Refills: 0 | Status: DISCONTINUED | OUTPATIENT
Start: 2023-05-13 | End: 2023-05-15

## 2023-05-13 RX ORDER — SODIUM CHLORIDE 9 MG/ML
500 INJECTION, SOLUTION INTRAVENOUS ONCE
Refills: 0 | Status: COMPLETED | OUTPATIENT
Start: 2023-05-13 | End: 2023-05-13

## 2023-05-13 RX ORDER — SODIUM CHLORIDE 9 MG/ML
1000 INJECTION, SOLUTION INTRAVENOUS
Refills: 0 | Status: DISCONTINUED | OUTPATIENT
Start: 2023-05-13 | End: 2023-05-13

## 2023-05-13 RX ORDER — SODIUM CHLORIDE 9 MG/ML
1000 INJECTION, SOLUTION INTRAVENOUS ONCE
Refills: 0 | Status: DISCONTINUED | OUTPATIENT
Start: 2023-05-13 | End: 2023-05-14

## 2023-05-13 RX ORDER — OXYCODONE HYDROCHLORIDE 5 MG/1
5 TABLET ORAL
Refills: 0 | Status: DISCONTINUED | OUTPATIENT
Start: 2023-05-13 | End: 2023-05-13

## 2023-05-13 RX ORDER — CHLORHEXIDINE GLUCONATE 213 G/1000ML
1 SOLUTION TOPICAL DAILY
Refills: 0 | Status: DISCONTINUED | OUTPATIENT
Start: 2023-05-13 | End: 2023-05-13

## 2023-05-13 RX ORDER — OXYCODONE HYDROCHLORIDE 5 MG/1
10 TABLET ORAL
Refills: 0 | Status: DISCONTINUED | OUTPATIENT
Start: 2023-05-13 | End: 2023-05-13

## 2023-05-13 RX ORDER — ONDANSETRON 8 MG/1
4 TABLET, FILM COATED ORAL ONCE
Refills: 0 | Status: DISCONTINUED | OUTPATIENT
Start: 2023-05-13 | End: 2023-05-13

## 2023-05-13 RX ORDER — NALBUPHINE HYDROCHLORIDE 10 MG/ML
2.5 INJECTION, SOLUTION INTRAMUSCULAR; INTRAVENOUS; SUBCUTANEOUS EVERY 6 HOURS
Refills: 0 | Status: DISCONTINUED | OUTPATIENT
Start: 2023-05-13 | End: 2023-05-15

## 2023-05-13 RX ORDER — ACETAMINOPHEN 500 MG
1000 TABLET ORAL ONCE
Refills: 0 | Status: DISCONTINUED | OUTPATIENT
Start: 2023-05-13 | End: 2023-05-13

## 2023-05-13 RX ORDER — DIPHENHYDRAMINE HCL 50 MG
25 CAPSULE ORAL ONCE
Refills: 0 | Status: COMPLETED | OUTPATIENT
Start: 2023-05-13 | End: 2023-05-13

## 2023-05-13 RX ORDER — NALOXONE HYDROCHLORIDE 4 MG/.1ML
0.1 SPRAY NASAL
Refills: 0 | Status: DISCONTINUED | OUTPATIENT
Start: 2023-05-13 | End: 2023-05-15

## 2023-05-13 RX ORDER — OXYTOCIN 10 UNIT/ML
2 VIAL (ML) INJECTION
Qty: 30 | Refills: 0 | Status: DISCONTINUED | OUTPATIENT
Start: 2023-05-13 | End: 2023-05-13

## 2023-05-13 RX ORDER — DEXAMETHASONE 0.5 MG/5ML
4 ELIXIR ORAL EVERY 6 HOURS
Refills: 0 | Status: DISCONTINUED | OUTPATIENT
Start: 2023-05-13 | End: 2023-05-15

## 2023-05-13 RX ORDER — FAMOTIDINE 10 MG/ML
20 INJECTION INTRAVENOUS ONCE
Refills: 0 | Status: COMPLETED | OUTPATIENT
Start: 2023-05-13 | End: 2023-05-13

## 2023-05-13 RX ORDER — SODIUM CHLORIDE 9 MG/ML
1000 INJECTION INTRAMUSCULAR; INTRAVENOUS; SUBCUTANEOUS
Refills: 0 | Status: DISCONTINUED | OUTPATIENT
Start: 2023-05-13 | End: 2023-05-13

## 2023-05-13 RX ORDER — GENTAMICIN SULFATE 40 MG/ML
400 VIAL (ML) INJECTION ONCE
Refills: 0 | Status: COMPLETED | OUTPATIENT
Start: 2023-05-13 | End: 2023-05-13

## 2023-05-13 RX ORDER — OXYTOCIN 10 UNIT/ML
333.33 VIAL (ML) INJECTION
Qty: 20 | Refills: 0 | Status: DISCONTINUED | OUTPATIENT
Start: 2023-05-13 | End: 2023-05-14

## 2023-05-13 RX ORDER — AMPICILLIN TRIHYDRATE 250 MG
2 CAPSULE ORAL ONCE
Refills: 0 | Status: COMPLETED | OUTPATIENT
Start: 2023-05-13 | End: 2023-05-13

## 2023-05-13 RX ORDER — MORPHINE SULFATE 50 MG/1
2 CAPSULE, EXTENDED RELEASE ORAL ONCE
Refills: 0 | Status: DISCONTINUED | OUTPATIENT
Start: 2023-05-13 | End: 2023-05-15

## 2023-05-13 RX ORDER — AMPICILLIN TRIHYDRATE 250 MG
1 CAPSULE ORAL EVERY 4 HOURS
Refills: 0 | Status: DISCONTINUED | OUTPATIENT
Start: 2023-05-13 | End: 2023-05-13

## 2023-05-13 RX ORDER — AMPICILLIN TRIHYDRATE 250 MG
2 CAPSULE ORAL EVERY 6 HOURS
Refills: 0 | Status: DISCONTINUED | OUTPATIENT
Start: 2023-05-13 | End: 2023-05-13

## 2023-05-13 RX ADMIN — Medication 108 GRAM(S): at 19:48

## 2023-05-13 RX ADMIN — Medication 108 GRAM(S): at 11:04

## 2023-05-13 RX ADMIN — Medication 500 MILLIGRAM(S): at 22:17

## 2023-05-13 RX ADMIN — Medication 200 GRAM(S): at 02:07

## 2023-05-13 RX ADMIN — SODIUM CHLORIDE 125 MILLILITER(S): 9 INJECTION, SOLUTION INTRAVENOUS at 02:11

## 2023-05-13 RX ADMIN — Medication 25 MILLIGRAM(S): at 20:55

## 2023-05-13 RX ADMIN — SODIUM CHLORIDE 125 MILLILITER(S): 9 INJECTION INTRAMUSCULAR; INTRAVENOUS; SUBCUTANEOUS at 02:11

## 2023-05-13 RX ADMIN — Medication 2 MILLIUNIT(S)/MIN: at 08:29

## 2023-05-13 RX ADMIN — Medication 108 GRAM(S): at 15:29

## 2023-05-13 RX ADMIN — Medication 400 MILLIGRAM(S): at 21:20

## 2023-05-13 RX ADMIN — SODIUM CHLORIDE 125 MILLILITER(S): 9 INJECTION INTRAMUSCULAR; INTRAVENOUS; SUBCUTANEOUS at 15:29

## 2023-05-13 RX ADMIN — Medication 108 GRAM(S): at 07:00

## 2023-05-13 RX ADMIN — CHLORHEXIDINE GLUCONATE 1 APPLICATION(S): 213 SOLUTION TOPICAL at 04:00

## 2023-05-13 RX ADMIN — FAMOTIDINE 20 MILLIGRAM(S): 10 INJECTION INTRAVENOUS at 13:47

## 2023-05-13 NOTE — OB RN DELIVERY SUMMARY - NS_SEPSISRSKCALC_OBGYN_ALL_OB_FT
EOS calculated successfully. EOS Risk Factor: 0.5/1000 live births ([facility] national incidence); GA=39w3d; Temp=100.04; ROM=23.65; GBS='Positive'; Antibiotics='GBS specific antibiotics > 2 hrs prior to birth'

## 2023-05-13 NOTE — OB PROVIDER H&P - HISTORY OF PRESENT ILLNESS
32 y/o  GDMA1 at 39.2 weeks presents with contractions every 4-5 minutes. Denies leakage of clear fluid or vaginal bleeding.  PNC complicated by PPROM at 24 weeks - hospitalized x 2 weeks , given betamethasone  & , given Magnesium Sulfate. Membranes resealed

## 2023-05-13 NOTE — OB PROVIDER DELIVERY SUMMARY - NSPROVIDERDELIVERYNOTE_OBGYN_ALL_OB_FT
delivery for nonreassuring fetal status remote from delivery and arrest disorder.   No complications.   Mother and baby to recovery in stable condition.   Intrapartum ampicillin and gent for suspected chorioamnionitis.   Soraya Bear MD  delivery for nonreassuring fetal status remote from delivery and arrest disorder.   No complications.   Mother and baby to recovery in stable condition.   Intrapartum ampicillin and gent for suspected chorioamnionitis.   Soraya Bear MD    Dictation#: [id number]

## 2023-05-13 NOTE — OB PROVIDER H&P - ASSESSMENT
32 y/o  GDMA1 at 39.2 weeks with contractions every 3-6 minutes  - 2cm dilated, not in active labor  - Late decel on NST, continue monitoring for another 1 hr  - BPP     - PROM @ 23:48, VE unchanged /-3  - Admit to L&D for induction with buccal cytotec   - Vertex presentation  - GBS negative   - Admission labs done  - Consent forms signed  - Case discussed with Dr. Soraya Jackman PGY-3 and Dr. Bennett

## 2023-05-13 NOTE — OB RN DELIVERY SUMMARY - NSSELHIDDEN_OBGYN_ALL_OB_FT
[NS_DeliveryAttending1_OBGYN_ALL_OB_FT:[id number]],[NS_DeliveryAssist1_OBGYN_ALL_OB_FT:[id number]],[NS_DeliveryRN_OBGYN_ALL_OB_FT:[id number]]

## 2023-05-13 NOTE — OB PROVIDER H&P - NSLOWPPHRISK_OBGYN_A_OB
No previous uterine incision/[place] Pregnancy/Less than or equal to 4 previous vaginal births/No known bleeding disorder/No history of postpartum hemorrhage/No other PPH risks indicated

## 2023-05-13 NOTE — OB PROVIDER LABOR PROGRESS NOTE - ASSESSMENT
IOL for PROM@12a.    -Labor: spBC, cervical exam unchanged, continue pitocin, IUPC placed  -Fetus: cat 2 with intermittent variable and late decels, mod variability and accels present with intermittent periods of cat 1. 500cc LR bolus given  -Pain: epidural in place, effective    D/w Tasha Harkins PGY4 and Dr. Mere Mccann PGY1
Plan   transition to pitocin   anesthesia called for epidural  cont EFM/[place]  rotating fluids for A1   anticipate      Soraya Jackman MD PGY3   
Pt has developed edematous cervix.   - 25mg IV Benadryl x1  - resuscitate tracing with repositioning  - continue current management    D/w Jaylen Briggs PGY1
IOL for PROM@12a.    -Labor: spBC, for pitocin when tracing remains cat 1  -Fetus: cat 2 with intermittent variable and late decels, mod variability and accels present. 1L LR bolus started, maternal repositioning, O2 given with improvement in tracing  -Pain: epidural in place, effective    D/w Tasha Harkins PGY4 and Dr. Johan Mccann PGY1
Patient requesting  section. Course now c/b chorioamnionitis. Patient has additionally now with swollen cervix ~4.5cm.   - patient counseled for , agrees  - consents to be signed with Dr. Marianela Avitia PGY-4

## 2023-05-13 NOTE — OB RN DELIVERY SUMMARY - NS_LABORCHARACTER_OBGYN_ALL_OB
Induction of labor-Medicinal/Augmentation of labor/Febrile (>38C)/External electronic FM/Antibiotics in labor/Chorioamnionitis

## 2023-05-13 NOTE — OB RN DELIVERY SUMMARY - AS DELIV COMPLICATIONS OB
abnormal fetal heart rate tracing/chorioamnionitis/maternal fever/prolonged rupture of membranes abnormal fetal heart rate tracing/chorioamnionitis/maternal fever/prolonged rupture of membranes/premature rupture of membranes prior to labor

## 2023-05-13 NOTE — OB PROVIDER LABOR PROGRESS NOTE - NS_SUBJECTIVE/OBJECTIVE_OBGYN_ALL_OB_FT
R4 Note 05-13-23 @ 14:20    Pt seen for progression and counseling regarding plan of care    Repeat SVE unchanged    Pt is ruptured and on pitcoin since 830a    VITALS:  T(C): 37.0 (05-13-23 @ 13:30), Max: 37.0 (05-13-23 @ 13:30)  HR: 94 (05-13-23 @ 14:17) (70 - 148)  BP: 134/79 (05-13-23 @ 14:17) (102/55 - 141/100)  RR: 16 (05-13-23 @ 13:30) (16 - 17)  SpO2: 100% (05-13-23 @ 14:15) (82% - 100%)    EFM:   BPM, Mod Variability, +Accel, +Int Dany and Early Decels  [place]: Ctx Q2-4min  VE: 4.5/80/-2     FHR Category: 2    PLAN:  Cont pitocin, titrate til adequate  Will re-eval at 8pm unless maternal or fetal status changes  If no cervical change ruptured 12 hr on pitocin, will consider failed IOL and discuss CS      d/w Mere Funez Kirshenbaum Gerber PGY4
Pt seen and examined at bedside due to cat 2 tracing.    Vital Signs Last 24 Hrs  T(C): 36.8 (13 May 2023 04:30), Max: 36.8 (12 May 2023 22:02)  T(F): 98.24 (13 May 2023 04:30), Max: 98.24 (13 May 2023 03:30)  HR: 91 (13 May 2023 08:01) (70 - 121)  BP: 124/83 (13 May 2023 08:01) (102/55 - 141/100)  BP(mean): --  RR: 17 (13 May 2023 04:30) (16 - 17)  SpO2: 94% (13 May 2023 08:00) (82% - 100%)    Parameters below as of 12 May 2023 22:02  Patient On (Oxygen Delivery Method): room air
R4 Note    Patient examined for cervical change, course now c/b chorioamnionitis.
Pt seen and examined at bedside.    Vital Signs Last 24 Hrs  T(C): 36.8 (13 May 2023 04:30), Max: 36.8 (12 May 2023 22:02)  T(F): 98.24 (13 May 2023 04:30), Max: 98.24 (13 May 2023 03:30)  HR: 120 (13 May 2023 11:31) (70 - 148)  BP: 125/76 (13 May 2023 11:31) (102/55 - 141/100)  BP(mean): --  RR: 17 (13 May 2023 04:30) (16 - 17)  SpO2: 100% (13 May 2023 11:30) (82% - 100%)    Parameters below as of 12 May 2023 22:02  Patient On (Oxygen Delivery Method): room air
Pt evaluated to assess cervical change
R3 Labor Note    Patient examined for further induction agents

## 2023-05-13 NOTE — OB PROVIDER DELIVERY SUMMARY - NSSELHIDDEN_OBGYN_ALL_OB_FT
[NS_DeliveryAttending1_OBGYN_ALL_OB_FT:[id number]],[NS_DeliveryAssist1_OBGYN_ALL_OB_FT:[id number]],[NS_DeliveryRN_OBGYN_ALL_OB_FT:[id number]]

## 2023-05-14 LAB
BASOPHILS # BLD AUTO: 0 K/UL — SIGNIFICANT CHANGE UP (ref 0–0.2)
BASOPHILS NFR BLD AUTO: 0 % — SIGNIFICANT CHANGE UP (ref 0–2)
EOSINOPHIL # BLD AUTO: 0 K/UL — SIGNIFICANT CHANGE UP (ref 0–0.5)
EOSINOPHIL NFR BLD AUTO: 0 % — SIGNIFICANT CHANGE UP (ref 0–6)
HCT VFR BLD CALC: 27.8 % — LOW (ref 34.5–45)
HGB BLD-MCNC: 9.1 G/DL — LOW (ref 11.5–15.5)
IANC: 12.99 K/UL — HIGH (ref 1.8–7.4)
LYMPHOCYTES # BLD AUTO: 0.53 K/UL — LOW (ref 1–3.3)
LYMPHOCYTES # BLD AUTO: 3.5 % — LOW (ref 13–44)
MCHC RBC-ENTMCNC: 24.8 PG — LOW (ref 27–34)
MCHC RBC-ENTMCNC: 32.7 GM/DL — SIGNIFICANT CHANGE UP (ref 32–36)
MCV RBC AUTO: 75.7 FL — LOW (ref 80–100)
MONOCYTES # BLD AUTO: 0 K/UL — SIGNIFICANT CHANGE UP (ref 0–0.9)
MONOCYTES NFR BLD AUTO: 0 % — LOW (ref 2–14)
NEUTROPHILS # BLD AUTO: 14.74 K/UL — HIGH (ref 1.8–7.4)
NEUTROPHILS NFR BLD AUTO: 91.3 % — HIGH (ref 43–77)
PLATELET # BLD AUTO: 228 K/UL — SIGNIFICANT CHANGE UP (ref 150–400)
RBC # BLD: 3.67 M/UL — LOW (ref 3.8–5.2)
RBC # FLD: 17.8 % — HIGH (ref 10.3–14.5)
WBC # BLD: 15.27 K/UL — HIGH (ref 3.8–10.5)
WBC # FLD AUTO: 15.27 K/UL — HIGH (ref 3.8–10.5)

## 2023-05-14 RX ORDER — ACETAMINOPHEN 500 MG
975 TABLET ORAL
Refills: 0 | Status: DISCONTINUED | OUTPATIENT
Start: 2023-05-14 | End: 2023-05-16

## 2023-05-14 RX ORDER — LANOLIN
1 OINTMENT (GRAM) TOPICAL EVERY 6 HOURS
Refills: 0 | Status: DISCONTINUED | OUTPATIENT
Start: 2023-05-14 | End: 2023-05-16

## 2023-05-14 RX ORDER — OXYCODONE HYDROCHLORIDE 5 MG/1
5 TABLET ORAL ONCE
Refills: 0 | Status: DISCONTINUED | OUTPATIENT
Start: 2023-05-14 | End: 2023-05-16

## 2023-05-14 RX ORDER — IBUPROFEN 200 MG
600 TABLET ORAL EVERY 6 HOURS
Refills: 0 | Status: COMPLETED | OUTPATIENT
Start: 2023-05-14 | End: 2024-04-11

## 2023-05-14 RX ORDER — OXYTOCIN 10 UNIT/ML
333.33 VIAL (ML) INJECTION
Qty: 20 | Refills: 0 | Status: DISCONTINUED | OUTPATIENT
Start: 2023-05-14 | End: 2023-05-15

## 2023-05-14 RX ORDER — MAGNESIUM HYDROXIDE 400 MG/1
30 TABLET, CHEWABLE ORAL
Refills: 0 | Status: DISCONTINUED | OUTPATIENT
Start: 2023-05-14 | End: 2023-05-16

## 2023-05-14 RX ORDER — TETANUS TOXOID, REDUCED DIPHTHERIA TOXOID AND ACELLULAR PERTUSSIS VACCINE, ADSORBED 5; 2.5; 8; 8; 2.5 [IU]/.5ML; [IU]/.5ML; UG/.5ML; UG/.5ML; UG/.5ML
0.5 SUSPENSION INTRAMUSCULAR ONCE
Refills: 0 | Status: DISCONTINUED | OUTPATIENT
Start: 2023-05-14 | End: 2023-05-16

## 2023-05-14 RX ORDER — SIMETHICONE 80 MG/1
80 TABLET, CHEWABLE ORAL EVERY 4 HOURS
Refills: 0 | Status: DISCONTINUED | OUTPATIENT
Start: 2023-05-14 | End: 2023-05-16

## 2023-05-14 RX ORDER — KETOROLAC TROMETHAMINE 30 MG/ML
30 SYRINGE (ML) INJECTION EVERY 6 HOURS
Refills: 0 | Status: DISCONTINUED | OUTPATIENT
Start: 2023-05-14 | End: 2023-05-15

## 2023-05-14 RX ORDER — AMPICILLIN TRIHYDRATE 250 MG
2 CAPSULE ORAL ONCE
Refills: 0 | Status: COMPLETED | OUTPATIENT
Start: 2023-05-14 | End: 2023-05-14

## 2023-05-14 RX ORDER — SODIUM CHLORIDE 9 MG/ML
1000 INJECTION, SOLUTION INTRAVENOUS
Refills: 0 | Status: DISCONTINUED | OUTPATIENT
Start: 2023-05-14 | End: 2023-05-15

## 2023-05-14 RX ORDER — OXYCODONE HYDROCHLORIDE 5 MG/1
5 TABLET ORAL
Refills: 0 | Status: DISCONTINUED | OUTPATIENT
Start: 2023-05-14 | End: 2023-05-16

## 2023-05-14 RX ORDER — HEPARIN SODIUM 5000 [USP'U]/ML
5000 INJECTION INTRAVENOUS; SUBCUTANEOUS EVERY 12 HOURS
Refills: 0 | Status: DISCONTINUED | OUTPATIENT
Start: 2023-05-14 | End: 2023-05-16

## 2023-05-14 RX ORDER — DIPHENHYDRAMINE HCL 50 MG
25 CAPSULE ORAL EVERY 6 HOURS
Refills: 0 | Status: DISCONTINUED | OUTPATIENT
Start: 2023-05-14 | End: 2023-05-16

## 2023-05-14 RX ADMIN — Medication 30 MILLIGRAM(S): at 21:47

## 2023-05-14 RX ADMIN — Medication 975 MILLIGRAM(S): at 06:35

## 2023-05-14 RX ADMIN — Medication 975 MILLIGRAM(S): at 17:38

## 2023-05-14 RX ADMIN — Medication 30 MILLIGRAM(S): at 09:10

## 2023-05-14 RX ADMIN — HEPARIN SODIUM 5000 UNIT(S): 5000 INJECTION INTRAVENOUS; SUBCUTANEOUS at 06:06

## 2023-05-14 RX ADMIN — Medication 975 MILLIGRAM(S): at 06:05

## 2023-05-14 RX ADMIN — Medication 200 GRAM(S): at 11:51

## 2023-05-14 RX ADMIN — Medication 975 MILLIGRAM(S): at 11:51

## 2023-05-14 RX ADMIN — Medication 30 MILLIGRAM(S): at 21:17

## 2023-05-14 RX ADMIN — Medication 30 MILLIGRAM(S): at 16:30

## 2023-05-14 RX ADMIN — Medication 975 MILLIGRAM(S): at 12:27

## 2023-05-14 RX ADMIN — Medication 975 MILLIGRAM(S): at 18:23

## 2023-05-14 RX ADMIN — Medication 100 MILLIGRAM(S): at 08:40

## 2023-05-14 RX ADMIN — Medication 30 MILLIGRAM(S): at 08:40

## 2023-05-14 RX ADMIN — Medication 30 MILLIGRAM(S): at 15:52

## 2023-05-14 RX ADMIN — HEPARIN SODIUM 5000 UNIT(S): 5000 INJECTION INTRAVENOUS; SUBCUTANEOUS at 17:39

## 2023-05-14 NOTE — PROGRESS NOTE ADULT - ATTENDING COMMENTS
Patient evaluated at bedside earlier this morning, says she is feeling well and has no complaints this morning. Pain is well controlled and she is ambulating, tolerating PO, ambulating, and passing flatus. Carrasquillo in place, has not yet voided. Vitals reviewed: afebrile /69, HR 71. Well appearing, NAD. Abdomen soft, appropriately tender, non-distended, no fundal tenderness, dermabond prineo in place and incision c/d/i, lochia wnl. No calf tenderness b/l. Labs reviewed: WBC 15.3, Hb 9.1, Plt 228.  A/P: 34yo P1 now POD1 s/p primary low transverse  delivery for nonreassuring fetal status remote from delivery and arrest of dilation c/b suspected intra-amniotic infection s/p IV A/G/C. Patient is meeting appropriate postpartum milestones for POD1. Carrasquillo to be removed this morning, f/u trial of void. Patient requesting progesterone only pills for contraception, to be sent to pharmacy. Also wants circumcision for her baby. Routine postpartum care.     Mimi HURD  Ob Service Attending Patient evaluated at bedside earlier this morning, says she is feeling well and has no complaints this morning. Pain is well controlled and she is ambulating, tolerating PO, ambulating, and passing flatus. Carrasquillo in place, has not yet voided. Vitals reviewed: afebrile /69, HR 71. Well appearing, NAD. Abdomen soft, appropriately tender, non-distended, no fundal tenderness, dermabond prineo in place and incision c/d/i, lochia wnl. No calf tenderness b/l. Labs reviewed: WBC 15.3, Hb 9.1, Plt 228.  A/P: 32yo P1 now POD1 s/p primary low transverse  delivery for nonreassuring fetal status remote from delivery and arrest of dilation c/b GDMA1 and suspected intra-amniotic infection s/p IV A/G/C. Patient is meeting appropriate postpartum milestones for POD1. Carrasquillo to be removed this morning, f/u trial of void. Patient requesting progesterone only pills for contraception, to be sent to pharmacy. Also wants circumcision for her baby. Routine postpartum care. Will need glucose tolerance test 4-12 wks postpartum.    Mimi HURD  Ob Service Attending

## 2023-05-14 NOTE — OB RN INTRAOPERATIVE NOTE - NSSELHIDDEN_OBGYN_ALL_OB_FT
[NS_DeliveryAttending1_OBGYN_ALL_OB_FT:[id number]],[NS_DeliveryAssist1_OBGYN_ALL_OB_FT:[id number]],[NS_DeliveryRN_OBGYN_ALL_OB_FT:[id number]]

## 2023-05-14 NOTE — PROGRESS NOTE ADULT - ASSESSMENT
34y/o  POD#1 from pLTCS for arrest of dilation/cat 2, , c/b chorio. H/H 9.1/27.8. Overall pt recovering well post-operatively.    #Chorio  -T38.1 on @9p  -Pt afebrile, asymptomatic this AM  -S/p A/G/T  -WBC 8.6->15.27  -F/u AM CBC    #Post-operative state  - Continue with po analgesia  - Increase ambulation  - Continue regular diet  - Check CBC  - Incision dressing removed  - Pt counseled on pp contraception, still deciding at this time    Chanelle Mccann, PGY1

## 2023-05-15 ENCOUNTER — TRANSCRIPTION ENCOUNTER (OUTPATIENT)
Age: 33
End: 2023-05-15

## 2023-05-15 LAB
HCT VFR BLD CALC: 24.1 % — LOW (ref 34.5–45)
HGB BLD-MCNC: 7.7 G/DL — LOW (ref 11.5–15.5)
MCHC RBC-ENTMCNC: 24.2 PG — LOW (ref 27–34)
MCHC RBC-ENTMCNC: 32 GM/DL — SIGNIFICANT CHANGE UP (ref 32–36)
MCV RBC AUTO: 75.8 FL — LOW (ref 80–100)
NRBC # BLD: 0 /100 WBCS — SIGNIFICANT CHANGE UP (ref 0–0)
NRBC # FLD: 0 K/UL — SIGNIFICANT CHANGE UP (ref 0–0)
PLATELET # BLD AUTO: 243 K/UL — SIGNIFICANT CHANGE UP (ref 150–400)
RBC # BLD: 3.18 M/UL — LOW (ref 3.8–5.2)
RBC # FLD: 17.8 % — HIGH (ref 10.3–14.5)
WBC # BLD: 7.67 K/UL — SIGNIFICANT CHANGE UP (ref 3.8–10.5)
WBC # FLD AUTO: 7.67 K/UL — SIGNIFICANT CHANGE UP (ref 3.8–10.5)

## 2023-05-15 RX ORDER — IBUPROFEN 200 MG
1 TABLET ORAL
Qty: 0 | Refills: 0 | DISCHARGE
Start: 2023-05-15

## 2023-05-15 RX ORDER — POLYETHYLENE GLYCOL 3350 17 G/17G
17 POWDER, FOR SOLUTION ORAL DAILY
Refills: 0 | Status: DISCONTINUED | OUTPATIENT
Start: 2023-05-15 | End: 2023-05-16

## 2023-05-15 RX ORDER — IBUPROFEN 200 MG
600 TABLET ORAL EVERY 6 HOURS
Refills: 0 | Status: DISCONTINUED | OUTPATIENT
Start: 2023-05-15 | End: 2023-05-16

## 2023-05-15 RX ORDER — FERROUS SULFATE 325(65) MG
325 TABLET ORAL DAILY
Refills: 0 | Status: DISCONTINUED | OUTPATIENT
Start: 2023-05-15 | End: 2023-05-16

## 2023-05-15 RX ORDER — ASCORBIC ACID 60 MG
500 TABLET,CHEWABLE ORAL DAILY
Refills: 0 | Status: DISCONTINUED | OUTPATIENT
Start: 2023-05-15 | End: 2023-05-16

## 2023-05-15 RX ORDER — NORETHINDRONE 0.35 MG/1
1 TABLET ORAL
Qty: 90 | Refills: 3
Start: 2023-05-15 | End: 2024-05-08

## 2023-05-15 RX ORDER — ACETAMINOPHEN 500 MG
3 TABLET ORAL
Qty: 0 | Refills: 0 | DISCHARGE
Start: 2023-05-15

## 2023-05-15 RX ADMIN — Medication 600 MILLIGRAM(S): at 10:29

## 2023-05-15 RX ADMIN — HEPARIN SODIUM 5000 UNIT(S): 5000 INJECTION INTRAVENOUS; SUBCUTANEOUS at 06:10

## 2023-05-15 RX ADMIN — Medication 600 MILLIGRAM(S): at 11:25

## 2023-05-15 RX ADMIN — Medication 500 MILLIGRAM(S): at 12:22

## 2023-05-15 RX ADMIN — Medication 975 MILLIGRAM(S): at 06:10

## 2023-05-15 RX ADMIN — Medication 600 MILLIGRAM(S): at 17:45

## 2023-05-15 RX ADMIN — Medication 325 MILLIGRAM(S): at 12:22

## 2023-05-15 RX ADMIN — HEPARIN SODIUM 5000 UNIT(S): 5000 INJECTION INTRAVENOUS; SUBCUTANEOUS at 17:45

## 2023-05-15 RX ADMIN — Medication 30 MILLIGRAM(S): at 04:46

## 2023-05-15 RX ADMIN — Medication 975 MILLIGRAM(S): at 06:40

## 2023-05-15 RX ADMIN — Medication 30 MILLIGRAM(S): at 05:26

## 2023-05-15 RX ADMIN — Medication 600 MILLIGRAM(S): at 18:38

## 2023-05-15 RX ADMIN — Medication 975 MILLIGRAM(S): at 21:16

## 2023-05-15 RX ADMIN — Medication 975 MILLIGRAM(S): at 22:00

## 2023-05-15 NOTE — DISCHARGE NOTE OB - PLAN OF CARE
After discharge, please stay on pelvic rest for 6 weeks, meaning no sexual intercourse, no tampons and no douching. If fever, chills, N/V/D, or any other signs of acute infection present, come in to the emergency department. After discharge, please stay on pelvic rest for 6 weeks, meaning no sexual intercourse, no tampons and no douching.  No driving for 2 weeks as women can loose a lot of blood during delivery and there is a possibility of being lightheaded/fainting.  No lifting objects heavier than baby for two weeks.  Expect to have vaginal bleeding/spotting for up to six weeks.  The bleeding should get lighter and more white/light brown with time.  For bleeding soaking more than a pad an hour or passing clots greater than the size of your fist, come in to the emergency department.    Follow up in clinic in 2 weeks for incision check.  Call clinic for noticeable increase in redness or swelling at incision, discharge from incision, or opening of skin at incision site. You were treated for intra-amniotic infection with antibiotics. No further antibiotics required at home. Recommend a glucose tolerance test at 6-8 weeks postpartum. You do not need to continue finger stick blood glucose testing. After discharge, please stay on pelvic rest for 6 weeks, meaning no sexual intercourse, no tampons and no douching.  No driving for 2 weeks as women can loose a lot of blood during delivery and there is a possibility of being lightheaded/fainting.  No lifting objects heavier than baby for two weeks.  Expect to have vaginal bleeding/spotting for up to six weeks.  The bleeding should get lighter and more white/light brown with time.  For bleeding soaking more than a pad an hour or passing clots greater than the size of your fist, come in to the emergency department.    Follow up with private OBGYN in 2 weeks for incision check.  Call the office for noticeable increase in redness or swelling at incision, discharge from incision, or opening of skin at incision site.

## 2023-05-15 NOTE — DISCHARGE NOTE OB - MEDICATION SUMMARY - MEDICATIONS TO TAKE
I will START or STAY ON the medications listed below when I get home from the hospital:    ibuprofen 600 mg oral tablet  -- 1 tab(s) by mouth every 6 hours  -- Indication: For Pain    acetaminophen 325 mg oral tablet  -- 3 tab(s) by mouth every 6 hours  -- Indication: For Pain    Prenatal Multivitamins with Folic Acid 1 mg oral tablet  -- 1 tab(s) by mouth once a day  -- Indication: For Postpartum state    ferrous sulfate 325 mg (65 mg elemental iron) oral tablet  -- 1 tab(s) by mouth once a day  -- Indication: For Postpartum state    ascorbic acid 500 mg oral tablet  -- 1 tab(s) by mouth once a day  -- Indication: For Postpartum state

## 2023-05-15 NOTE — DISCHARGE NOTE OB - PATIENT PORTAL LINK FT
You can access the FollowMyHealth Patient Portal offered by [facility] by registering at the following website: [email or web address]. By joining [employer]’s FollowMyHealth portal, you will also be able to view your health information using other applications (apps) compatible with our system.

## 2023-05-15 NOTE — PROGRESS NOTE ADULT - ASSESSMENT
A/P: 32yo POD#2 s/p LTCS d/t Cat II tracing, remote from delivery; . Hematocrit trend 38.5->27.8, which is an inappropriate decrease based on patient's QBL. However, pt's VSS and she has been asymptomatic. Course c/b Chorio. Overall pt recovering well post-operatively.    #Chorio  -T38.1 on 5/13@9p  -Pt afebrile, asymptomatic this AM  -S/p A/G/T  -WBC 8.6->15.3    #Routine Postpartum Care  - Continue with PO pain management  - Increase ambulation  - Continue regular diet  - DVT prophylaxis with Heparin  - BC: POP's      Carlene Sims, PGY-1 A/P: 32yo POD#2 s/p LTCS d/t Cat II tracing, remote from delivery; . Hematocrit trend 38.5->27.8, which is an inappropriate decrease based on patient's QBL. However, pt's VSS and she has been asymptomatic. Course c/b Chorio. Overall pt recovering well post-operatively.    #Chorio  -T38.1 on 5/13@9p  -Pt afebrile, asymptomatic this AM  -S/p A/G/T  -WBC 8.6->15.3    #Acute Blood Loss Anemia  - Hematocrit trend 38.5->27.8  - VSS, asymptomatic  - Vit C, Iron, Miralax ordered     #Routine Postpartum Care  - Continue with PO pain management  - Increase ambulation  - Continue regular diet  - DVT prophylaxis with Heparin  - BC: POP's      Carlene Sims, PGY-1 A/P: 34yo POD#2 s/p LTCS d/t Cat II tracing, remote from delivery; . Hematocrit trend 38.5->27.8, which is an inappropriate decrease based on patient's QBL. However, pt's VSS and she has been asymptomatic. Course c/b Chorio. Overall pt recovering well post-operatively.    #Chorio  -T38.1 on @9p  -Pt afebrile, asymptomatic this AM  -S/p A/G/T  -WBC 8.6->15.3    #Acute Blood Loss Anemia  - Hematocrit trend 38.5->27.8  - VSS, asymptomatic  - Vit C, Iron, Miralax ordered     #Routine Postpartum Care  - Continue with PO pain management  - Increase ambulation  - Continue regular diet  - DVT prophylaxis with Heparin  - BC: POP'benja Sims, PGY-1        OB Hospitalist Fellow Addendum    Patient evaluated at bedside this morning, says she is feeling well and has no complaints, says she feels ready to go home today. She is meeting all postpartum/ post-op milestones. Vitals reviewed: /70, HR 96. NAD, well appearing. Abdomen soft, non-tender, mildly distended, no rebound/guarding, incision c/d/i, lochia wnl, no calf tenderness b/l.    A/P: 34yo P1 now POD2 s/p primary low transverse  delivery for nonreassuring fetal status remote from delivery and arrest of dilation c/b GDMA1 and suspected intra-amniotic infection s/p IV A/G/C. Patient is meeting appropriate postpartum milestones and is stable for discharge home. Patient requesting progesterone only pills for contraception, to be sent to pharmacy. Routine postpartum care. Will need glucose tolerance test 4-12 wks postpartum. Patient to be seen by attending Dr. Sandoval prior to discharge.    Mimi PGY5   A/P: 32yo POD#2 s/p LTCS d/t Cat II tracing, remote from delivery; . Hematocrit trend 38.5->27.8, which is an inappropriate decrease based on patient's QBL. However, pt's VSS and she has been asymptomatic. Course c/b Chorio. Overall pt recovering well post-operatively.    #Chorio  -T38.1 on @9p  -Pt afebrile, asymptomatic this AM  -S/p A/G/T  -WBC 8.6->15.3    #Acute Blood Loss Anemia  - Hematocrit trend 38.5->27.8  - VSS, asymptomatic  - Vit C, Iron, Miralax ordered     #Routine Postpartum Care  - Continue with PO pain management  - Increase ambulation  - Continue regular diet  - DVT prophylaxis with Heparin  - BC: POP'benja Sims, PGY-1        OB Hospitalist Fellow Addendum    Patient evaluated at bedside this morning, says she is feeling well and has no complaints, says she feels ready to go home today. She is meeting all postpartum/ post-op milestones. Vitals reviewed: /70, HR 96. NAD, well appearing. Abdomen soft, non-tender, mildly distended, no rebound/guarding, incision c/d/i, lochia wnl, no calf tenderness b/l.    A/P: 32yo P1 now POD2 s/p primary low transverse  delivery for nonreassuring fetal status remote from delivery and arrest of dilation c/b GDMA1 and suspected intra-amniotic infection s/p IV A/G/C. Patient is meeting appropriate postpartum milestones and is stable for discharge home, though not eligible for early discharge (prior to POD3) given diagnosis of suspected intra-amniotic infection. Patient requesting progesterone only pills for contraception, to be sent to pharmacy. Routine postpartum care. Will need glucose tolerance test 4-12 wks postpartum. Patient to be seen by attending Dr. Sandoval prior to discharge.    Mimi PGY5

## 2023-05-15 NOTE — DISCHARGE NOTE OB - MEDICATION SUMMARY - MEDICATIONS TO CHANGE
I will SWITCH the dose or number of times a day I take the medications listed below when I get home from the hospital:    norethindrone 0.35 mg oral tablet  -- 1 tab(s) by mouth every 24 hours MDD: 1

## 2023-05-15 NOTE — PROVIDER CONTACT NOTE (OTHER) - ASSESSMENT
Last dose of pain medication - 600mg Ibuprofen at 1029
Last dose of pain medication - 600mg Ibuprofen at 1029

## 2023-05-15 NOTE — PROVIDER CONTACT NOTE (OTHER) - SITUATION
Patient refusing standing dose of pain meds at 1200
Patient refusing standing dose of pain meds at 1200

## 2023-05-15 NOTE — PROGRESS NOTE ADULT - ATTENDING COMMENTS
Associate Chief of L & D ( late entry)    OB Progress Note:  Delivery, POD#2    S: 32yo POD#2 s/p LTCS . Her pain is well controlled. She is tolerating a regular diet and passing flatus. Denies N/V. Denies CP/SOB/lightheadedness/dizziness.   She is ambulating without difficulty.   Voiding spontaneously.     O:   Vital Signs Last 24 Hrs  T(C): 36.7 (15 May 2023 05:50), Max: 36.7 (15 May 2023 05:50)  T(F): 98.1 (15 May 2023 05:50), Max: 98.1 (15 May 2023 05:50)  HR: 96 (15 May 2023 05:50) (69 - 96)  BP: 113/70 (15 May 2023 05:50) (103/67 - 113/70)  RR: 17 (15 May 2023 05:50) (16 - 18)  SpO2: 100% (15 May 2023 05:50) (100% - 100%)    Parameters below as of 15 May 2023 05:50  Patient On (Oxygen Delivery Method): room air        Labs:  Blood type: B Positive  Rubella IgG: RPR: Negative                          9.1<L>   15.27<H> >-----------< 228    ( 14 @ 06:36 )             27.8<L>                        12.2   8.60 >-----------< 273    ( 13 @ 00:25 )             38.5      PE:    Abdomen: soft, fundus firm, Mildly distended, mildly  tender  incision c/d/i.  Lochia: scant  Extremities: No erythema, trace edema    A/P: 32yo POD#2 s/p LTCS. complicated by Chorioamnionitis who was treated with one dose of ABX and acute blood loss anemia    - Continue regular diet.  - Increase ambulation.  - Continue Motrin, Tylenol, oxycodone PRN for pain control.  vs. continue PCEA for pain.   Patient is not a candidate for early discharge       Genevieve Holly M.D., M.B.A., M.S. Associate Chief of L & D ( late entry)    OB Progress Note:  Delivery, POD#2    S: 32yo POD#2 s/p LTCS . Her pain is well controlled. She is tolerating a regular diet and passing flatus. Denies N/V. Denies CP/SOB/lightheadedness/dizziness.   She is ambulating without difficulty.   Voiding spontaneously.     O:   Vital Signs Last 24 Hrs  T(C): 36.7 (15 May 2023 05:50), Max: 36.7 (15 May 2023 05:50)  T(F): 98.1 (15 May 2023 05:50), Max: 98.1 (15 May 2023 05:50)  HR: 96 (15 May 2023 05:50) (69 - 96)  BP: 113/70 (15 May 2023 05:50) (103/67 - 113/70)  RR: 17 (15 May 2023 05:50) (16 - 18)  SpO2: 100% (15 May 2023 05:50) (100% - 100%)    Parameters below as of 15 May 2023 05:50  Patient On (Oxygen Delivery Method): room air        Labs:  Blood type: B Positive  Rubella IgG: RPR: Negative                          9.1<L>   15.27<H> >-----------< 228    ( 14 @ 06:36 )             27.8<L>                        12.2   8.60 >-----------< 273    ( 13 @ 00:25 )             38.5      PE:    Abdomen: soft, fundus firm, Mildly distended, mildly  tender  incision c/d/i.  Lochia: scant  Extremities: No erythema, trace edema    A/P: 32yo POD#2 s/p LTCS. complicated by Chorioamnionitis who was treated with one dose of ABX and acute blood loss anemia    - Continue regular diet.  - Increase ambulation.  - Continue Motrin, Tylenol, oxycodone PRN for pain control.  vs. continue PCEA for pain.  - CBC today    Patient is not a candidate for early discharge       Genevieve Holly M.D., M.B.A., M.S.

## 2023-05-15 NOTE — DISCHARGE NOTE OB - HOSPITAL COURSE
Hospital Course:  Patient underwent an uncomplicated pLTCS for Cat II tracing. (please see operative note for details of procedure.)     EBL: 455    Hct: 38.5->27.8    Patient's post operative course was unremarkable and she remained hemodynamically stable and afebrile throughout. Upon discharge on POD2, the patient is ambulating and voiding spontaneously, tolerated oral intake, pain was well controlled with oral medications and vital signs were stable. Hospital Course:  Patient underwent an uncomplicated pLTCS for Cat II tracing. (please see operative note for details of procedure.) Intrapartum course also complicated by chorioamnionitis treated with Ampicillin and Gentamicin.    EBL: 455    Hct: 38.5->27.8    Patient's post operative course was unremarkable and she remained hemodynamically stable and afebrile throughout. Upon discharge on POD3, the patient is ambulating and voiding spontaneously, tolerated oral intake, pain was well controlled with oral medications and vital signs were stable. She will follow up in [facility] clinic in 2 weeks for an incision check and again 4 weeks later for full postpartum visit. Hospital Course:  Patient underwent an uncomplicated pLTCS for Cat II tracing. (please see operative note for details of procedure.) Intrapartum course also complicated by chorioamnionitis treated with Ampicillin and Gentamicin.    EBL: 455    Hct: 38.5->27.8    Patient's post operative course was unremarkable and she remained hemodynamically stable and afebrile throughout. Upon discharge on POD3, the patient is ambulating and voiding spontaneously, tolerated oral intake, pain was well controlled with oral medications and vital signs were stable. She will follow up w/ private OBGYN in 2 weeks for an incision check and again 4 weeks later for full postpartum visit.

## 2023-05-15 NOTE — DISCHARGE NOTE OB - CARE PROVIDER_API CALL
[facility],   [facility]  Ambulatory Care Unit  [address] [place] [address]  Phone: [phone]  Fax: (   )    -  Follow Up Time: Routine   Susannah Hermosillo (DO)  Obstetrics and Gynecology  [address] [place] [address], [address]  [place] [address]  Phone: [phone]  Fax: [phone]  Established Patient  Follow Up Time: Routine

## 2023-05-15 NOTE — PROVIDER CONTACT NOTE (OTHER) - RECOMMENDATIONS
Continue to monitor and assess pain every 3 hours. Educate patient to notify nursing when in pain.
Continue to monitor and assess pain every 3 hours. Educate patient to notify nursing when in pain.

## 2023-05-15 NOTE — DISCHARGE NOTE OB - PROVIDER TOKENS
FREE:[LAST:[[facility]],PHONE:[phone]],FAX:[(   )    -],ADDRESS:[[facility]  Ambulatory Care Unit  [phone] [place] [address]],FOLLOWUP:[Routine]] PROVIDER:[TOKEN:[[id number]],FOLLOWUP:[Routine],ESTABLISHEDPATIENT:[T]]

## 2023-05-15 NOTE — DISCHARGE NOTE OB - CARE PLAN
Principal Discharge DX:	Status post  delivery  Assessment and plan of treatment:	After discharge, please stay on pelvic rest for 6 weeks, meaning no sexual intercourse, no tampons and no douching.  No driving for 2 weeks as women can loose a lot of blood during delivery and there is a possibility of being lightheaded/fainting.  No lifting objects heavier than baby for two weeks.  Expect to have vaginal bleeding/spotting for up to six weeks.  The bleeding should get lighter and more white/light brown with time.  For bleeding soaking more than a pad an hour or passing clots greater than the size of your fist, come in to the emergency department.    Follow up in clinic in 2 weeks for incision check.  Call clinic for noticeable increase in redness or swelling at incision, discharge from incision, or opening of skin at incision site.  Secondary Diagnosis:	Chorioamnionitis  Assessment and plan of treatment:	After discharge, please stay on pelvic rest for 6 weeks, meaning no sexual intercourse, no tampons and no douching. If fever, chills, N/V/D, or any other signs of acute infection present, come in to the emergency department.   1 Principal Discharge DX:	Status post  delivery  Assessment and plan of treatment:	After discharge, please stay on pelvic rest for 6 weeks, meaning no sexual intercourse, no tampons and no douching.  No driving for 2 weeks as women can loose a lot of blood during delivery and there is a possibility of being lightheaded/fainting.  No lifting objects heavier than baby for two weeks.  Expect to have vaginal bleeding/spotting for up to six weeks.  The bleeding should get lighter and more white/light brown with time.  For bleeding soaking more than a pad an hour or passing clots greater than the size of your fist, come in to the emergency department.    Follow up in clinic in 2 weeks for incision check.  Call clinic for noticeable increase in redness or swelling at incision, discharge from incision, or opening of skin at incision site.  Secondary Diagnosis:	Chorioamnionitis  Assessment and plan of treatment:	You were treated for intra-amniotic infection with antibiotics. No further antibiotics required at home.  Secondary Diagnosis:	Gestational diabetes  Assessment and plan of treatment:	Recommend a glucose tolerance test at 6-8 weeks postpartum. You do not need to continue finger stick blood glucose testing.   Principal Discharge DX:	Status post  delivery  Assessment and plan of treatment:	After discharge, please stay on pelvic rest for 6 weeks, meaning no sexual intercourse, no tampons and no douching.  No driving for 2 weeks as women can loose a lot of blood during delivery and there is a possibility of being lightheaded/fainting.  No lifting objects heavier than baby for two weeks.  Expect to have vaginal bleeding/spotting for up to six weeks.  The bleeding should get lighter and more white/light brown with time.  For bleeding soaking more than a pad an hour or passing clots greater than the size of your fist, come in to the emergency department.    Follow up with private OBGYN in 2 weeks for incision check.  Call the office for noticeable increase in redness or swelling at incision, discharge from incision, or opening of skin at incision site.  Secondary Diagnosis:	Chorioamnionitis  Assessment and plan of treatment:	You were treated for intra-amniotic infection with antibiotics. No further antibiotics required at home.  Secondary Diagnosis:	Gestational diabetes  Assessment and plan of treatment:	Recommend a glucose tolerance test at 6-8 weeks postpartum. You do not need to continue finger stick blood glucose testing.

## 2023-05-16 ENCOUNTER — APPOINTMENT (OUTPATIENT)
Dept: OBGYN | Facility: CLINIC | Age: 33
End: 2023-05-16

## 2023-05-16 VITALS
RESPIRATION RATE: 17 BRPM | DIASTOLIC BLOOD PRESSURE: 74 MMHG | HEART RATE: 71 BPM | SYSTOLIC BLOOD PRESSURE: 129 MMHG | TEMPERATURE: 99 F | OXYGEN SATURATION: 100 %

## 2023-05-16 LAB
HCT VFR BLD CALC: 22.6 % — LOW (ref 34.5–45)
HGB BLD-MCNC: 7.4 G/DL — LOW (ref 11.5–15.5)
MCHC RBC-ENTMCNC: 24.4 PG — LOW (ref 27–34)
MCHC RBC-ENTMCNC: 32.7 GM/DL — SIGNIFICANT CHANGE UP (ref 32–36)
MCV RBC AUTO: 74.6 FL — LOW (ref 80–100)
NRBC # BLD: 0 /100 WBCS — SIGNIFICANT CHANGE UP (ref 0–0)
NRBC # FLD: 0 K/UL — SIGNIFICANT CHANGE UP (ref 0–0)
PLATELET # BLD AUTO: 246 K/UL — SIGNIFICANT CHANGE UP (ref 150–400)
RBC # BLD: 3.03 M/UL — LOW (ref 3.8–5.2)
RBC # FLD: 17.6 % — HIGH (ref 10.3–14.5)
WBC # BLD: 7.29 K/UL — SIGNIFICANT CHANGE UP (ref 3.8–10.5)
WBC # FLD AUTO: 7.29 K/UL — SIGNIFICANT CHANGE UP (ref 3.8–10.5)

## 2023-05-16 RX ADMIN — HEPARIN SODIUM 5000 UNIT(S): 5000 INJECTION INTRAVENOUS; SUBCUTANEOUS at 05:09

## 2023-05-16 RX ADMIN — Medication 975 MILLIGRAM(S): at 06:00

## 2023-05-16 RX ADMIN — Medication 975 MILLIGRAM(S): at 12:47

## 2023-05-16 RX ADMIN — Medication 975 MILLIGRAM(S): at 12:07

## 2023-05-16 RX ADMIN — Medication 600 MILLIGRAM(S): at 00:52

## 2023-05-16 RX ADMIN — Medication 600 MILLIGRAM(S): at 09:34

## 2023-05-16 RX ADMIN — Medication 325 MILLIGRAM(S): at 12:07

## 2023-05-16 RX ADMIN — Medication 600 MILLIGRAM(S): at 08:47

## 2023-05-16 RX ADMIN — POLYETHYLENE GLYCOL 3350 17 GRAM(S): 17 POWDER, FOR SOLUTION ORAL at 12:07

## 2023-05-16 RX ADMIN — Medication 975 MILLIGRAM(S): at 05:10

## 2023-05-16 RX ADMIN — Medication 600 MILLIGRAM(S): at 01:00

## 2023-05-16 RX ADMIN — Medication 500 MILLIGRAM(S): at 12:08

## 2023-05-16 NOTE — PROGRESS NOTE ADULT - ASSESSMENT
POD#3 s/p LTCS for arrest of dilation and category 2 FHR tracing remote from delivery.   Labor complicated by suspected chorioamnionitis, she has completed IV antibiotics.   Afebrile, met all postpartum milestones.     She is stable for discharge to home.   Postpartum preeclampsia and bleeding precautions were reviewed.   POPs sent to pharmacy.   All questions answered.   She has an appointment to see Dr. Hermosillo in the office next week.     Soraya Bear MD

## 2023-05-16 NOTE — PROGRESS NOTE ADULT - ATTENDING COMMENTS
Associate Chief of L & D ( late entry)    OB Progress Note:  Delivery, POD#3    S: 32yo POD#3 s/p LTCS . Patient denies any complaints  O:   Vital Signs Last 24 Hrs  T(C): 36.7 (15 May 2023 05:50), Max: 36.7 (15 May 2023 05:50)  T(F): 98.1 (15 May 2023 05:50), Max: 98.1 (15 May 2023 05:50)  HR: 96 (15 May 2023 05:50) (69 - 96)  BP: 113/70 (15 May 2023 05:50) (103/67 - 113/70)  RR: 17 (15 May 2023 05:50) (16 - 18)  SpO2: 100% (15 May 2023 05:50) (100% - 100%)    Parameters below as of 15 May 2023 05:50  Patient On (Oxygen Delivery Method): room air        Labs:  Blood type: B Positive  Rubella IgG: RPR: Negative  LABS:                        7.4    7.29  )-----------( 246      ( 16 May 2023 05:45 )             22.6                             9.1<L>   15.27<H> >-----------< 228    ( 14 @ 06:36 )             27.8<L>                        12.2   8.60 >-----------< 273    ( 13 @ 00:25 )             38.5      PE:    Abdomen: soft, fundus firm, Mildly distended, mildly  tender  incision c/d/i.  Lochia: scant  Extremities: No erythema, trace edema    A/P: 32yo POD#3 s/p LTCS. complicated by Chorioamnionitis who was treated with one dose of ABX and acute blood loss anemia    -  Patient is stable for discharge and will follow up with Dr Bay Holly M.D., M.B.A., M.S.

## 2023-05-16 NOTE — PROGRESS NOTE ADULT - ASSESSMENT
A/P: 32yo POD#3 s/p LTCS d/t Cat II tracing, remote from delivery; . H/H trend 12.2/38.5->9.1/27.8->7.7/24.1->7.4/22.6 which is now stable. Pt's VSS and she has been asymptomatic. Course c/b Chorio. Overall pt recovering well post-operatively.    #Chorio  -T38.1 on 5/13@9p  -Pt afebrile, asymptomatic this AM  -S/p A/G/T  -WBC 8.6->15.3    #Acute Blood Loss Anemia  - Hematocrit trend 38.5->27.8  - VSS, asymptomatic  - Continue Vit C, Iron, Miralax     #Routine Postpartum Care  - Continue with PO pain management  - Increase ambulation  - Continue regular diet  - DVT prophylaxis with Heparin       Carlene Sims, PGY-1

## 2023-05-16 NOTE — PROGRESS NOTE ADULT - SUBJECTIVE AND OBJECTIVE BOX
OB Progress Note: LTCS, POD#2    S: 34yo POD#2 s/p LTCS. Pain is well controlled. She is tolerating a regular diet and passing flatus. She is voiding spontaneously, and ambulating without difficulty. Endorses light vaginal bleeding, soaking <1 pad/hr. Denies CP/SOB. Denies lightheadedness/dizziness. Denies N/V.    O:  Vitals:  Vital Signs Last 24 Hrs  T(C): 36.7 (15 May 2023 05:50), Max: 36.7 (15 May 2023 05:50)  T(F): 98.1 (15 May 2023 05:50), Max: 98.1 (15 May 2023 05:50)  HR: 96 (15 May 2023 05:50) (69 - 96)  BP: 113/70 (15 May 2023 05:50) (102/69 - 115/67)  BP(mean): --  RR: 17 (15 May 2023 05:50) (16 - 18)  SpO2: 100% (15 May 2023 05:50) (100% - 100%)    Parameters below as of 15 May 2023 05:50  Patient On (Oxygen Delivery Method): room air        PE:  General: NAD  Heart: extremities well-perfused  Lungs: breathing normally  Abdomen: Soft, appropriately tender, incision c/d/i,   Extremities: No erythema, no pitting edema    MEDICATIONS  (STANDING):  acetaminophen     Tablet .. 975 milliGRAM(s) Oral <User Schedule>  diphtheria/tetanus/pertussis (acellular) Vaccine (Adacel) 0.5 milliLiter(s) IntraMuscular once  heparin   Injectable 5000 Unit(s) SubCutaneous every 12 hours  ibuprofen  Tablet. 600 milliGRAM(s) Oral every 6 hours  lactated ringers. 1000 milliLiter(s) (75 mL/Hr) IV Continuous <Continuous>      MEDICATIONS  (PRN):  dexAMETHasone  Injectable 4 milliGRAM(s) IV Push every 6 hours PRN Nausea  diphenhydrAMINE 25 milliGRAM(s) Oral every 6 hours PRN Pruritus  lanolin Ointment 1 Application(s) Topical every 6 hours PRN Sore Nipples  magnesium hydroxide Suspension 30 milliLiter(s) Oral two times a day PRN Constipation  nalbuphine Injectable 2.5 milliGRAM(s) IV Push every 6 hours PRN Pruritus  naloxone Injectable 0.1 milliGRAM(s) IV Push every 3 minutes PRN For ANY of the following changes in patient status:  A. Breaths Per Minute LESS THAN 10, B. Oxygen saturation LESS THAN 90%, C. Sedation score of 6 for Stop After: 4 Times  ondansetron Injectable 4 milliGRAM(s) IV Push every 6 hours PRN Nausea  oxyCODONE    IR 5 milliGRAM(s) Oral every 3 hours PRN Moderate to Severe Pain (4-10)  oxyCODONE    IR 5 milliGRAM(s) Oral once PRN Moderate to Severe Pain (4-10)  simethicone 80 milliGRAM(s) Chew every 4 hours PRN Gas      Labs:  Blood type: B Positive  Rubella IgG: RPR: Negative                          9.1<L>   15.27<H> >-----------< 228    ( 05-14 @ 06:36 )             27.8<L>                        12.2   8.60 >-----------< 273    ( 05-13 @ 00:25 )             38.5                  
R1 Progress Note    Patient seen and examined at bedside, no acute overnight events. No acute complaints, pain well controlled. Patient is ambulating and tolerating regular diet. Has not yet passed flatus. [place] is still in place. Denies CP, SOB, N/V, HA, blurred vision, epigastric pain.    Vital Signs Last 24 Hours  T(C): 36.4 (05-14-23 @ 06:40), Max: 38.1 (05-14-23 @ 00:06)  HR: 88 (05-14-23 @ 06:40) (63 - 148)  BP: 118/66 (05-14-23 @ 06:40) (107/63 - 139/87)  RR: 18 (05-14-23 @ 06:40) (16 - 23)  SpO2: 98% (05-14-23 @ 06:40) (90% - 100%)    I&O's Summary    13 May 2023 07:01  -  14 May 2023 07:00  --------------------------------------------------------  IN: 4400 mL / OUT: 2635 mL / NET: 1765 mL        Physical Exam:  General: NAD  Abdomen: Soft, non-tender, non-distended, fundus firm  Incision: Pfannenstiel incision CDI, subcuticular suture closure  Pelvic: Lochia wnl    Labs:    Blood Type: B Positive  Antibody Screen: Negative  RPR: Negative               9.1    15.27 )-----------( 228      ( 05-14 @ 06:36 )             27.8                12.2   8.60  )-----------( 273      ( 05-13 @ 00:25 )             38.5         MEDICATIONS  (STANDING):  acetaminophen     Tablet .. 975 milliGRAM(s) Oral <User Schedule>  clindamycin IVPB 900 milliGRAM(s) IV Intermittent once  diphtheria/tetanus/pertussis (acellular) Vaccine (Adacel) 0.5 milliLiter(s) IntraMuscular once  heparin   Injectable 5000 Unit(s) SubCutaneous every 12 hours  ibuprofen  Tablet. 600 milliGRAM(s) Oral every 6 hours  ketorolac   Injectable 30 milliGRAM(s) IV Push every 6 hours  lactated ringers. 1000 milliLiter(s) (75 mL/Hr) IV Continuous <Continuous>  morphine PF Epidural 2 milliGRAM(s) Epidural once  oxytocin Infusion 333.333 milliUNIT(s)/Min (1000 mL/Hr) IV Continuous <Continuous>    MEDICATIONS  (PRN):  dexAMETHasone  Injectable 4 milliGRAM(s) IV Push every 6 hours PRN Nausea  diphenhydrAMINE 25 milliGRAM(s) Oral every 6 hours PRN Pruritus  lanolin Ointment 1 Application(s) Topical every 6 hours PRN Sore Nipples  magnesium hydroxide Suspension 30 milliLiter(s) Oral two times a day PRN Constipation  nalbuphine Injectable 2.5 milliGRAM(s) IV Push every 6 hours PRN Pruritus  naloxone Injectable 0.1 milliGRAM(s) IV Push every 3 minutes PRN For ANY of the following changes in patient status:  A. Breaths Per Minute LESS THAN 10, B. Oxygen saturation LESS THAN 90%, C. Sedation score of 6 for Stop After: 4 Times  ondansetron Injectable 4 milliGRAM(s) IV Push every 6 hours PRN Nausea  oxyCODONE    IR 5 milliGRAM(s) Oral every 3 hours PRN Moderate to Severe Pain (4-10)  oxyCODONE    IR 5 milliGRAM(s) Oral once PRN Moderate to Severe Pain (4-10)  oxyCODONE    IR 5 milliGRAM(s) Oral every 3 hours PRN Mild Pain (1 - 3)  oxyCODONE    IR 10 milliGRAM(s) Oral every 3 hours PRN Moderate Pain (4 - 6)  simethicone 80 milliGRAM(s) Chew every 4 hours PRN Gas  
ANESTHESIA POSTOP CHECK    33y Female POSTOP DAY 1 S/P       NO APPARENT ANESTHESIA COMPLICATIONS    
OB Progress Note: LTCS, POD#3    S: 34yo POD#3 s/p LTCS. Pain is well controlled. She is tolerating a regular diet and passing flatus. She is voiding spontaneously, and ambulating without difficulty. Endorses light vaginal bleeding, soaking <1 pad/hr. Denies CP/SOB. Denies lightheadedness/dizziness. Denies N/V.    O:  Vitals:  Vital Signs Last 24 Hrs  T(C): 36.6 (16 May 2023 05:48), Max: 37 (15 May 2023 14:32)  T(F): 97.9 (16 May 2023 05:48), Max: 98.6 (15 May 2023 14:32)  HR: 71 (16 May 2023 05:48) (71 - 96)  BP: 122/71 (16 May 2023 05:48) (118/78 - 125/81)  BP(mean): --  RR: 18 (16 May 2023 05:48) (17 - 18)  SpO2: 100% (16 May 2023 05:48) (100% - 100%)    Parameters below as of 16 May 2023 05:48  Patient On (Oxygen Delivery Method): room air        PE:  General: NAD  Heart: extremities well-perfused  Lungs: breathing normally  Abdomen: Soft, appropriately tender, incision c/d/i,   Extremities: No erythema, no pitting edema    MEDICATIONS  (STANDING):  acetaminophen     Tablet .. 975 milliGRAM(s) Oral <User Schedule>  ascorbic acid 500 milliGRAM(s) Oral daily  diphtheria/tetanus/pertussis (acellular) Vaccine (Adacel) 0.5 milliLiter(s) IntraMuscular once  ferrous    sulfate 325 milliGRAM(s) Oral daily  heparin   Injectable 5000 Unit(s) SubCutaneous every 12 hours  ibuprofen  Tablet. 600 milliGRAM(s) Oral every 6 hours  polyethylene glycol 3350 17 Gram(s) Oral daily      MEDICATIONS  (PRN):  diphenhydrAMINE 25 milliGRAM(s) Oral every 6 hours PRN Pruritus  lanolin Ointment 1 Application(s) Topical every 6 hours PRN Sore Nipples  magnesium hydroxide Suspension 30 milliLiter(s) Oral two times a day PRN Constipation  oxyCODONE    IR 5 milliGRAM(s) Oral every 3 hours PRN Moderate to Severe Pain (4-10)  oxyCODONE    IR 5 milliGRAM(s) Oral once PRN Moderate to Severe Pain (4-10)  simethicone 80 milliGRAM(s) Chew every 4 hours PRN Gas      Labs:  Blood type: B Positive  Rubella IgG: RPR: Negative                          7.4<L>   7.29 >-----------< 246    ( 05-16 @ 05:45 )             22.6<L>                        7.7<L>   7.67 >-----------< 243    ( 05-15 @ 14:50 )             24.1<L>                        9.1<L>   15.27<H> >-----------< 228    ( 05-14 @ 06:36 )             27.8<L>                    
Patient seen and examined at bedside, no acute overnight events. No acute complaints, pain well controlled with Motrin and Tylenol.  Patient is ambulating and tolerating regular diet. Has had a bowel movement.   Lochia is normal.  She desires oral contraception.     Vital Signs Last 24 Hours  T(C): 37 (05-16-23 @ 14:07), Max: 37 (05-15-23 @ 14:32)  HR: 71 (05-16-23 @ 14:07) (71 - 96)  BP: 129/74 (05-16-23 @ 14:07) (118/78 - 129/74)  RR: 17 (05-16-23 @ 14:07) (17 - 18)  SpO2: 100% (05-16-23 @ 14:07) (100% - 100%)      Physical Exam:  General: NAD  Abdomen: Soft, appropriately tender, non-distended, fundus firm  Incision: Pfannenstiel incision CDI, subcuticular suture closure      Labs:    Blood Type: B Positive  Antibody Screen: Negative  RPR: Negative               7.4    7.29  )-----------( 246      ( 05-16 @ 05:45 )             22.6                7.7    7.67  )-----------( 243      ( 05-15 @ 14:50 )             24.1                9.1    15.27 )-----------( 228      ( 05-14 @ 06:36 )             27.8         MEDICATIONS  (STANDING):  acetaminophen     Tablet .. 975 milliGRAM(s) Oral <User Schedule>  ascorbic acid 500 milliGRAM(s) Oral daily  diphtheria/tetanus/pertussis (acellular) Vaccine (Adacel) 0.5 milliLiter(s) IntraMuscular once  ferrous    sulfate 325 milliGRAM(s) Oral daily  heparin   Injectable 5000 Unit(s) SubCutaneous every 12 hours  ibuprofen  Tablet. 600 milliGRAM(s) Oral every 6 hours  polyethylene glycol 3350 17 Gram(s) Oral daily    MEDICATIONS  (PRN):  diphenhydrAMINE 25 milliGRAM(s) Oral every 6 hours PRN Pruritus  lanolin Ointment 1 Application(s) Topical every 6 hours PRN Sore Nipples  magnesium hydroxide Suspension 30 milliLiter(s) Oral two times a day PRN Constipation  oxyCODONE    IR 5 milliGRAM(s) Oral every 3 hours PRN Moderate to Severe Pain (4-10)  oxyCODONE    IR 5 milliGRAM(s) Oral once PRN Moderate to Severe Pain (4-10)  simethicone 80 milliGRAM(s) Chew every 4 hours PRN Gas

## 2023-05-17 ENCOUNTER — APPOINTMENT (OUTPATIENT)
Dept: OBGYN | Facility: CLINIC | Age: 33
End: 2023-05-17

## 2023-05-25 ENCOUNTER — APPOINTMENT (OUTPATIENT)
Dept: OBGYN | Facility: CLINIC | Age: 33
End: 2023-05-25
Payer: COMMERCIAL

## 2023-05-25 VITALS
DIASTOLIC BLOOD PRESSURE: 96 MMHG | BODY MASS INDEX: 29.03 KG/M2 | WEIGHT: 144 LBS | SYSTOLIC BLOOD PRESSURE: 131 MMHG | HEIGHT: 59 IN

## 2023-05-25 PROCEDURE — 0503F POSTPARTUM CARE VISIT: CPT

## 2023-05-25 NOTE — HISTORY OF PRESENT ILLNESS
[Delivery Date: ___] : on [unfilled] [Primary C/S] : delivered by  section [Male] : Delivery History: baby boy [Wt. ___] : weighing [unfilled] [Breastfeeding] : currently nursing [Postpartum Follow Up] : postpartum follow up [None] : No associated symptoms are reported [Clean/Dry/Intact] : clean, dry and intact [Mild] : mild vaginal bleeding [Normal] : the vagina was normal [Not Done] : Examination of breasts not done [Awake] : awake [Alert] : alert [Oriented x3] : oriented to person, place, and time [Doing Well] : is doing well [No Sign of Infection] : is showing no signs of infection [Excellent Pain Control] : has excellent pain control [No [place]] : to avoid sexual intercourse [Complications:___] : no complications [S/Sx PP Depression] : no signs/symptoms of postpartum depression [Erythema] : not erythematous [Cervix Sample Taken] : cervical sample not taken for a Pap smear [Acute Distress] : no acute distress [FreeTextEntry8] : 32 yo female pt present for an incision check  [de-identified] : delivered by SP on 5/13 [de-identified] : She is currently breast feeding  [de-identified] : She reports a loss of appetite  [de-identified] : abd soft, nt, nd [de-identified] : 34 yo female pt s/p . Stable.  [de-identified] : rto 4 weeks for PPA, local care provided

## 2023-06-27 ENCOUNTER — APPOINTMENT (OUTPATIENT)
Dept: OBGYN | Facility: CLINIC | Age: 33
End: 2023-06-27
Payer: COMMERCIAL

## 2023-06-27 VITALS
SYSTOLIC BLOOD PRESSURE: 126 MMHG | BODY MASS INDEX: 28.83 KG/M2 | WEIGHT: 143 LBS | HEIGHT: 59 IN | DIASTOLIC BLOOD PRESSURE: 84 MMHG

## 2023-06-27 DIAGNOSIS — Z34.01 ENCOUNTER FOR SUPERVISION OF NORMAL FIRST PREGNANCY, FIRST TRIMESTER: ICD-10-CM

## 2023-06-27 DIAGNOSIS — O99.013 ANEMIA COMPLICATING PREGNANCY, THIRD TRIMESTER: ICD-10-CM

## 2023-06-27 DIAGNOSIS — Z34.03 ENCOUNTER FOR SUPERVISION OF NORMAL FIRST PREGNANCY, THIRD TRIMESTER: ICD-10-CM

## 2023-06-27 DIAGNOSIS — Z11.4 ENCOUNTER FOR SCREENING FOR HUMAN IMMUNODEFICIENCY VIRUS [HIV]: ICD-10-CM

## 2023-06-27 DIAGNOSIS — Z34.02 ENCOUNTER FOR SUPERVISION OF NORMAL FIRST PREGNANCY, SECOND TRIMESTER: ICD-10-CM

## 2023-06-27 PROCEDURE — 0503F POSTPARTUM CARE VISIT: CPT

## 2023-06-27 RX ORDER — MEDROXYPROGESTERONE ACETATE 150 MG/ML
150 INJECTION, SUSPENSION INTRAMUSCULAR
Qty: 1 | Refills: 1 | Status: ACTIVE | COMMUNITY
Start: 2023-06-27 | End: 1900-01-01

## 2023-07-10 ENCOUNTER — APPOINTMENT (OUTPATIENT)
Dept: OBGYN | Facility: CLINIC | Age: 33
End: 2023-07-10
Payer: COMMERCIAL

## 2023-07-10 PROCEDURE — 81025 URINE PREGNANCY TEST: CPT

## 2023-07-10 PROCEDURE — 96372 THER/PROPH/DIAG INJ SC/IM: CPT

## 2023-07-10 RX ORDER — MEDROXYPROGESTERONE ACETATE 150 MG/ML
150 INJECTION, SUSPENSION INTRAMUSCULAR
Qty: 0 | Refills: 0 | Status: COMPLETED | OUTPATIENT
Start: 2023-07-10

## 2023-07-10 RX ADMIN — MEDROXYPROGESTERONE ACETATE 0 MG/ML: 150 INJECTION, SUSPENSION, EXTENDED RELEASE INTRAMUSCULAR at 00:00

## 2023-11-07 ENCOUNTER — APPOINTMENT (OUTPATIENT)
Dept: ORTHOPEDIC SURGERY | Facility: CLINIC | Age: 33
End: 2023-11-07
Payer: COMMERCIAL

## 2023-11-07 VITALS — WEIGHT: 143 LBS | HEIGHT: 59 IN | BODY MASS INDEX: 28.83 KG/M2

## 2023-11-07 PROCEDURE — 72110 X-RAY EXAM L-2 SPINE 4/>VWS: CPT

## 2023-11-07 PROCEDURE — 99203 OFFICE O/P NEW LOW 30 MIN: CPT

## 2023-11-07 PROCEDURE — 72170 X-RAY EXAM OF PELVIS: CPT

## 2023-11-07 RX ORDER — MELOXICAM 15 MG/1
15 TABLET ORAL
Qty: 21 | Refills: 0 | Status: ACTIVE | COMMUNITY
Start: 2023-11-07 | End: 1900-01-01

## 2023-11-08 ENCOUNTER — APPOINTMENT (OUTPATIENT)
Dept: MRI IMAGING | Facility: CLINIC | Age: 33
End: 2023-11-08
Payer: COMMERCIAL

## 2023-11-08 PROCEDURE — 72148 MRI LUMBAR SPINE W/O DYE: CPT

## 2023-11-10 ENCOUNTER — APPOINTMENT (OUTPATIENT)
Dept: ORTHOPEDIC SURGERY | Facility: CLINIC | Age: 33
End: 2023-11-10
Payer: COMMERCIAL

## 2023-11-10 VITALS — HEIGHT: 59 IN | WEIGHT: 143 LBS | BODY MASS INDEX: 28.83 KG/M2

## 2023-11-10 DIAGNOSIS — M54.16 RADICULOPATHY, LUMBAR REGION: ICD-10-CM

## 2023-11-10 PROCEDURE — 99213 OFFICE O/P EST LOW 20 MIN: CPT

## 2023-11-25 NOTE — OB RN PATIENT PROFILE - EXTENSIONS OF SELF_ADULT
DISCHARGE SUMMARY and INSTRUCTIONS:    You are being discharged undelivered because you and your baby are in stable condition.    STATUS NOTE:  Date:  11/24/2023  Time:  1925    Destination: Home    ACTIVITY:  As tolerated    DIET:  Continue to eat a healthy pregnancy diet  Be sure to drink 8-10 glasses of water a day  Don't go more than 8-10 hours without eating or drinking    CONTACT YOUR DOCTOR OR MIDWIFE IF YOU HAVE ANY OF THESE WARNING SIGNS OF PREGNANCY:  Sudden and/or constant pain  Vaginal bleeding  Sudden gush or leaking fluid from the vagina  Fainting  Headache that will not go away with your normal comfort measures  Any changes in your vision, such as blurry vision, double vision or spots/stars before your eyes  Severe nausea and vomiting  Little or no urine, pain and burning with urination, or blood in your urine  Chills or fever  Increase or change in vaginal discharge  Your baby moves less than usual (See Fetal Movement Counting below.)      FETAL MOVEMENT COUNTING:  One way you can know your baby is doing well during pregnancy is to pay attention to his or her movements.  We recommend counting your baby's movements once each day beginning in the third trimester, or about the 26th week of pregnancy.      How to count baby's movements:  Choose a time that is convenient for you when the baby is active, such as after a meal.  Try to do it at the same time each day.  Sit comfortably or lie on your side.  Note the time on the clock when you're ready to begin counting.  Count each movement until the baby has moved 10 times.   Count all movements that are either seen or felt, including shifting, rolling, or kicking.  Do not count baby's hiccoughs.  If you have counted 10 movements in less than 2 hours, resume your normal activities and count again tomorrow.    If it takes longer than 1 hour to count 4 movements, try these suggestions:  Eat something if you haven't eaten within 2 to 3 hours before you  count.  Try to lie down in a quiet, undisturbed location, on either your right or left side.  Place your hands on your belly and focus on your baby's movements.  Continue your count from where you left off before, until you feel 10 movements.  If it took longer than 2 hours to count 10 movements, call your midwife or doctor right away for recommendations.    Remember:  By counting and staying aware of your baby's movements, you will be helping your caregivers provide the best possible care for you and your baby.      A copy of this form was provided to and reviewed with Selina Us by Satinder Bullock RN, 11/24/2023.  Patient verbalized understanding and has no further questions at this time.     None

## 2023-12-30 ENCOUNTER — EMERGENCY (EMERGENCY)
Facility: HOSPITAL | Age: 33
LOS: 0 days | Discharge: LEFT BEFORE DISCHARGE | End: 2023-12-30
Payer: COMMERCIAL

## 2023-12-30 VITALS
OXYGEN SATURATION: 98 % | DIASTOLIC BLOOD PRESSURE: 85 MMHG | HEIGHT: 59 IN | SYSTOLIC BLOOD PRESSURE: 146 MMHG | HEART RATE: 101 BPM | RESPIRATION RATE: 19 BRPM | WEIGHT: 156.09 LBS | TEMPERATURE: 98 F

## 2023-12-30 DIAGNOSIS — R07.9 CHEST PAIN, UNSPECIFIED: ICD-10-CM

## 2023-12-30 DIAGNOSIS — Z53.21 PROCEDURE AND TREATMENT NOT CARRIED OUT DUE TO PATIENT LEAVING PRIOR TO BEING SEEN BY HEALTH CARE PROVIDER: ICD-10-CM

## 2023-12-30 PROCEDURE — L9991: CPT

## 2023-12-30 PROCEDURE — 93010 ELECTROCARDIOGRAM REPORT: CPT

## 2023-12-30 NOTE — ED ADULT TRIAGE NOTE - CHIEF COMPLAINT QUOTE
pt c/o cough, congestion and chest pain radiating to the left arm for 2 days. denies shortness of breath, fever or chill.  no history.

## 2023-12-31 PROBLEM — Z11.3 ENCOUNTER FOR SCREENING EXAMINATION FOR SEXUALLY TRANSMITTED DISEASE: Status: ACTIVE | Noted: 2023-02-21

## 2024-02-28 NOTE — OB RN TRIAGE NOTE - PRO INTERPRETER NEED 2
[Time Spent: ___ minutes] : I have spent [unfilled] minutes of time on the encounter. [>50% of the face to face encounter time was spent on counseling and/or coordination of care for ___] : Greater than 50% of the face to face encounter time was spent on counseling and/or coordination of care for [unfilled] English

## 2024-03-31 LAB
25(OH)D3 SERPL-MCNC: 27.6 NG/ML
BLD GP AB SCN SERPL QL: NORMAL
HIV1+2 AB SPEC QL IA.RAPID: NONREACTIVE
T PALLIDUM AB SER QL IA: NEGATIVE

## 2024-04-01 NOTE — ED ADULT TRIAGE NOTE - ESI TRIAGE ACUITY LEVEL, MLM
3

## 2024-04-16 ENCOUNTER — NON-APPOINTMENT (OUTPATIENT)
Age: 34
End: 2024-04-16

## 2024-04-16 ENCOUNTER — APPOINTMENT (OUTPATIENT)
Dept: OBGYN | Facility: CLINIC | Age: 34
End: 2024-04-16
Payer: COMMERCIAL

## 2024-04-16 VITALS
SYSTOLIC BLOOD PRESSURE: 124 MMHG | BODY MASS INDEX: 32.25 KG/M2 | WEIGHT: 160 LBS | HEIGHT: 59 IN | DIASTOLIC BLOOD PRESSURE: 82 MMHG

## 2024-04-16 DIAGNOSIS — Z11.3 ENCOUNTER FOR SCREENING FOR INFECTIONS WITH A PREDOMINANTLY SEXUAL MODE OF TRANSMISSION: ICD-10-CM

## 2024-04-16 DIAGNOSIS — N92.1 EXCESSIVE AND FREQUENT MENSTRUATION WITH IRREGULAR CYCLE: ICD-10-CM

## 2024-04-16 PROCEDURE — 99214 OFFICE O/P EST MOD 30 MIN: CPT

## 2024-04-16 PROCEDURE — 81025 URINE PREGNANCY TEST: CPT

## 2024-04-16 PROCEDURE — 36415 COLL VENOUS BLD VENIPUNCTURE: CPT

## 2024-04-16 NOTE — PLAN
[FreeTextEntry1] : Abnormal Vaginal Bleeding -spotting x3 weeks -discussed use of ocps/LARCs/depo for contraception and bleeding regulation; declines for now.  -ucg negative today in office -hormone panel and STI screening done today -pep/hpv done -erx pelvic sono  rto for follow up/annual exam 1 month.

## 2024-04-16 NOTE — PHYSICAL EXAM
[Chaperone Declined] : Patient declined chaperone [Appropriately responsive] : appropriately responsive [Alert] : alert [No Acute Distress] : no acute distress [No Lymphadenopathy] : no lymphadenopathy [Oriented x3] : oriented x3 [Labia Majora] : normal [Labia Minora] : normal [Scant] : There was scant vaginal bleeding [Normal] : normal [Uterine Adnexae] : normal

## 2024-04-16 NOTE — HISTORY OF PRESENT ILLNESS
[FreeTextEntry1] : 35 y/o  LMP 3/30/2024 presents to office for acute visit c/o "prolonged vaginal bleeding for 3 weeks" Pts hx significant for PCOS (prior to pregnancy reports menses 3x/year).    23, Depo Provera injection for contraception 2023, no menses until 2023 then again 2024 and has been experiencing light vaginal bleeding since. Using condoms for pregnancy prevention but inconsistently per pt.   [facility].  [Patient reported PAP Smear was normal] : Patient reported PAP Smear was normal [PapSmeardate] : 2022 [TextBox_31] : @outside G[facility] [Patient would like to be screened for STIs] : Patient would like to be screened for STIs

## 2024-04-17 LAB
C TRACH RRNA SPEC QL NAA+PROBE: NOT DETECTED
ESTRADIOL SERPL-MCNC: 44 PG/ML
FSH SERPL-MCNC: 7.5 IU/L
HCG SERPL-MCNC: <1 MIU/ML
HIV1+2 AB SPEC QL IA.RAPID: NONREACTIVE
LH SERPL-ACNC: 14.5 IU/L
N GONORRHOEA RRNA SPEC QL NAA+PROBE: NOT DETECTED
PROLACTIN SERPL-MCNC: 7.8 NG/ML
SOURCE TP AMPLIFICATION: NORMAL
TSH SERPL-ACNC: 0.56 UIU/ML

## 2024-04-18 LAB
HBV SURFACE AG SER QL: NONREACTIVE
HCV AB SER QL: NONREACTIVE
HCV S/CO RATIO: 0.1 S/CO
T PALLIDUM AB SER QL IA: NEGATIVE

## 2024-04-22 DIAGNOSIS — N76.0 ACUTE VAGINITIS: ICD-10-CM

## 2024-04-22 DIAGNOSIS — B96.89 ACUTE VAGINITIS: ICD-10-CM

## 2024-04-22 LAB
CYTOLOGY CVX/VAG DOC THIN PREP: ABNORMAL
HPV HIGH+LOW RISK DNA PNL CVX: DETECTED

## 2024-04-22 RX ORDER — METRONIDAZOLE 7.5 MG/G
0.75 GEL VAGINAL
Qty: 1 | Refills: 0 | Status: ACTIVE | COMMUNITY
Start: 2024-04-22 | End: 1900-01-01

## 2024-05-03 ENCOUNTER — APPOINTMENT (OUTPATIENT)
Dept: ULTRASOUND IMAGING | Facility: IMAGING CENTER | Age: 34
End: 2024-05-03
Payer: COMMERCIAL

## 2024-05-03 ENCOUNTER — OUTPATIENT (OUTPATIENT)
Dept: OUTPATIENT SERVICES | Facility: HOSPITAL | Age: 34
LOS: 1 days | End: 2024-05-03
Payer: COMMERCIAL

## 2024-05-03 DIAGNOSIS — Z00.8 ENCOUNTER FOR OTHER GENERAL EXAMINATION: ICD-10-CM

## 2024-05-03 DIAGNOSIS — N92.1 EXCESSIVE AND FREQUENT MENSTRUATION WITH IRREGULAR CYCLE: ICD-10-CM

## 2024-05-03 PROCEDURE — 76830 TRANSVAGINAL US NON-OB: CPT

## 2024-05-03 PROCEDURE — 76856 US EXAM PELVIC COMPLETE: CPT | Mod: 26

## 2024-05-03 PROCEDURE — 76830 TRANSVAGINAL US NON-OB: CPT | Mod: 26

## 2024-05-03 PROCEDURE — 76856 US EXAM PELVIC COMPLETE: CPT

## 2024-05-30 ENCOUNTER — NON-APPOINTMENT (OUTPATIENT)
Age: 34
End: 2024-05-30

## 2024-05-30 ENCOUNTER — APPOINTMENT (OUTPATIENT)
Dept: OBGYN | Facility: CLINIC | Age: 34
End: 2024-05-30

## 2024-05-31 NOTE — OB PROVIDER LABOR PROGRESS NOTE - NS_OBIHIFHRDETAILS_OBGYN_ALL_OB_FT
MINH Valdes is 65 y.o. y/o male here for screening colonoscopy.    No immediate (parents/siblings) FH of colon cancer, no acute symptoms.     Past Medical History:   Diagnosis Date    Arthritis     Avascular necrosis of bones of both hips (HCC)     Avascular necrosis of humeral head (HCC)     Chronic foot pain     Chronic left hip pain     Chronic pain     Emphysema     Fatty infiltration of liver     GERD (gastroesophageal reflux disease)     H/O supraventricular tachycardia     History of bilateral hip replacements     History of intracranial aneurysm     History of right shoulder replacement     Hyperesthesia     Hypertension     Mixed hyperlipidemia     Patellar tendon rupture 1987    Peripheral neuropathy     Status post right shoulder hemiarthroplasty      Past Surgical History:   Procedure Laterality Date    CARDIAC PROCEDURE N/A 12/12/2022    Left heart cath / coronary angiography performed by Hernan Caldwell MD at [facility] CARDIAC CATH LAB    CARPAL TUNNEL RELEASE Right 2009    CRANIOTOMY      aneurysm clipped    HIP ARTHROSCOPY Left 02/08/2021    \"Revision\"    HIP ARTHROSCOPY Left 02/08/2021    SHOULDER ARTHROPLASTY Right 1987    TOTAL HIP ARTHROPLASTY Bilateral 1998 (L) & 2002 (R)     Family History   Problem Relation Age of Onset    Arthritis Brother     Arthritis Sister     Arthritis Sister     Hypertension Father     Arthritis Father     Dementia Mother     Arthritis Sister     Arthritis Brother     Kidney Disease Brother     Arthritis Brother     No Known Problems Paternal Grandfather     No Known Problems Paternal Grandmother     Arthritis Brother     Other Mother         Hypotension    No Known Problems Maternal Grandfather     No Known Problems Maternal Grandmother      Social History     Tobacco Use    Smoking status: Every Day     Current packs/day: 0.50     Types: Cigarettes    Smokeless tobacco: Never   Vaping Use    Vaping Use: Never used   Substance Use Topics    Alcohol use: Not Currently     
intermittent cat II in setting of cat I, tachycardia to the 170-180s at times
pt had 4 min prolonged decel after examination which improved with repositioning of patient. tracing returned to:  baseline 150  moderate variability  +accel  -decels
140, mod donald, +accels, -decels

## 2024-10-15 NOTE — OB RN DELIVERY SUMMARY - NSMECDELIVBABYA_OBGYN_ALL_OB
"Assessment:    Two week old infant gaining weight well, primarily bottlefeeding expressed breastmilk  Ongoing latch difficulties;  did not latch or suckle at breast today.  (Fed shortly before visit)  Mother with good milk supply  Mother with desire to decrease efforts at direct breastfeeding and focus on pumping for now;  may continue to offer breast occasionally    Plan:    Continue to offer Promise the breast if you like, when she is calm and slightly but not extremely hungry.  Make yourself comfortable, but you do not need to make it a complex process--it's ok to just sit down and see if it works.  Use the nipple shield if it is helpful.    Given breastfeeding challenges, although some families pursue all avenues and some wean to formula entirely, there are many other options for coping. These can include decreasing pumping efforts and using formula for supplementation, setting a time boundary on continuing to \"triple feed,\" or moving to exclusive pumping.  Breastfeeding does not need to be a black-and-white choice, and you can consider what works best for your family.     Right now Promise needs about 21 - 22 oz of milk each day to grow well, so this is all the amount that you need to pump;  this is 2.5 - 3 oz each pumping session if you pump 8 times/day.  You do not need to pump more than she needs; this encourages oversupply and also creates more effort and time spent for you.  Continue pumping as you have been to have this extra milk to offer to Promise and promote strong milk supply.  If the 21mm flange is comfortable, continue to use that.  If you continue exclusive pumping for some time, consider changing the soft pliable parts of the pump setup (the valves and duckbills) every month or two, to maintain good function.  Consider storing milk in amounts that Promise will normally take, and a few others in smaller amounts (like 1 ounce) so you can have some to offer in a smaller \"top off\" amount without wasting " larger quantities of milk.    Remember that babies are about a month old, they need about 25-30 oz/day (or 3- 4 oz each feeding if they eat about 8 times/day) and this where their needs stay for their entire first year;  you don't need to keep producing more and more milk as they grow.    Many babies have frequent spitting up:  This is because the valve between their stomach and esophagus is a bit loose.  It is not caused by inappropriate feeding or burping patterns.  Even if it looks like large amounts, it is not a health problem to be concerned with unless your baby is not gaining weight well, or is extremely fussy and inconsolable most of the time.  If your baby is periodically content and gaining weight well, spitting up is more of a laundry problem than a health problem.   Follow up with lactation as needed, and pediatric provider as planned in 2 weeks.  [email or web address] can be used for brief questions, but it's important to know that messages are not seen Friday through Sunday. If urgent help is needed, Monday through Friday you can call [phone] and one of our lactation consultants will get the message and respond; if you need a rapid response over a weekend or holiday, it is best to call your on-call maternity or pediatric provider.  Please feel free to schedule a return visit if the concern is more detailed;  telephone visits are also an option if you don't feel you need to be seen in person.         Subjective: Antony and Wil are here today for a followup visit;  they were first seen one week ago for difficulty getting baby Promise to latch:  had been exclusively pumping/bottlefeeding due to these concerns.  Baby was able to latch and transfer a small amount of milk at that visit, but did require frequent stimulation. Continued attempts at breastfeeding, with pumping and supplementation, were discussed.  Today Antony reports that Promise continues to have difficulties with latch;  does not sustain suction at the  "breast at all.  She is having some success with the nipple shield but states baby will \"fight it\" much of the time.  Feels that baby appears to be swallowing, but they feel that she may not be transferring well:  baby will often accept a bottle after nursing.  She is currently pumping 8 - 10 times/day, yielding around 25 - 30 oz/day, which they feed by bottle.    Antony shares that she is very discouraged:  \"I don't like breastfeeding.\"  States she is very stressed by the difficulties with trying to get Promise to latch.  Feels that if breastfeeding were going well, she would be open to making significant efforts towards it, but in the current situation would like to decrease efforts at direct breastfeeding and focus on primarily pumping.  She finds this easy and convenient. Is still open to trying direct breastfeeding occasionally, perhaps once or twice/day, but feels that her mood is significantly negatively impacted when trying to manage breastfeeding.      Antony is vaccinated for Covid-19, with most recent dose 9/11/24.    Hospital Course:  Augmentation for PROM in early labor;  uncomplicated birth.  Baby breastfeeding well in hospital;  seen by IBCLC for routine support.    Mother's Relevant Med/Surg History: PCOS previously on Metformin;  pyloric stenosis    Breastfeeding Goals: to re-establish some direct breastfeeding    Previous Breastfeeding Experience: first baby    Infant's name: Promise  Infant's bday: [birth year]  Gestational age: 40w3d  Infant's birth weight: 7 # 12.9 oz   Discharge weight: 7 # 6.2 oz     Pediatric Provider: [facility], [facility]. Antony gives her permission for today's note to be forwarded to [facility].  BLANCA signed and filed in Antony's chart as Promise has no local active pediatric chart.   Recent weights:  10/7/24:  7 # 5 oz  10/15/24:  7 # 12.5 oz    Peq Lactation Visit Questionnaire    10/16/2024  1:44 PM CDT - Filed by Patient   What is your main concern today? Follow up on lactation " "  Your baby's first name: Promise   Your baby's last name: Liz   Baby's most recent weight: 7lb 12.5oz   Date: 10/15   Since your last visit, have you had any new medical problems or surgeries? No   How often does your baby eat? Every 2-3hrs   How long does each feeding last?  15-20 minuges   How much of the time does your baby take both breasts when nursing? 25%   Can you hear the baby swallowing during nursing? Yes   How many times does your baby feed in 24 hours? 10   How many times does your baby urinate (pee) in 24 hours? 8   How many stools (poops) does your baby have in 24 hours? 8   Describe the color and consistency of the poop: Yellow seedy loose   Do you give your baby extra milk in addition to or instead of breastfeeding? Breastmilk   How much extra do you usually give? 2-2.5oz   How do you give extra milk? Bottle   Are you pumping your breasts? Yes   How often? Every 2-3hrs   How much is pumped? 2-3.5oz   What else would you like the lactation consultant to know? Breast feeding not going well, even with nipple shields and paced bottle feeding          Objective/Physical exam:   Her nipples are everted, the areola is compressible, the breast is soft and full.     Sore nipples: no   EPDS: 10, with \"never\" marked for question on thoughts of self-harm.  Antony states that her mood is very good \"when I'm not breastfeeding,\" and she has no concerns.  Tearful when discussing problems with feeding.  Feels that her mood would be much improved by decreasing efforts at direct breastfeeding.    Assessment of infant: 46.66% Weight for age percentile   Age today: 12 days  Today's weight: 8 # 0 oz    Amount of milk transferred: none measurable;  baby sleeping and not cueing to feed    Baby has full flexion of arms and legs, normal tone, behavior is alert and active, respirations are normal, skin is normal, hydration is normal, jaw is normal size and alignment.    Suck exam:  not done today    Baby thrush: none  "   Jaundice: none      Feeding assessment: Offered breast with nipple shield in place, but baby very sleepy and not cuing to feed (fed about one hour prior to visit).  Filled nipple shield with milk using hand expression, but baby showed no interest.     Alignment: The baby was flex relaxed. Baby's head was aligned with its trunk. Baby did face mother. Baby was in cross cradle position today.   Areolar Grasp: Baby did not latch to the breast.    Areolar Compression: Baby made no rhythmic motion.     Audible swallowing: Baby made no sounds of swallowing: did not latch.     Based on pumping output, The milk ejection reflex is normal and milk supply is normal.    There were no vitals taken for this visit.  OB History    Para Term  AB Living   1 1 1 0 0 1   SAB IAB Ectopic Multiple Live Births   0 0 0 0 1      # Outcome Date GA Lbr Murray/2nd Weight Sex Type Anes PTL Lv   1 Term 10/02/24 40w3d / 00:36 3.54 kg (7 lb 12.9 oz) F Vag-Spont EPI N CODIE      Name: Promise Hill      Apgar1: 8  Apgar5: 9       Current Outpatient Medications:     docusate sodium (COLACE) 100 MG capsule, Take 1 capsule (100 mg) by mouth 2 times daily as needed for constipation., Disp: , Rfl:     Prenatal Vit-Fe Fumarate-FA (PRENATAL MULTIVITAMIN  PLUS IRON) 27-1 MG TABS, Take by mouth daily, Disp: , Rfl:   Past Medical History:   Diagnosis Date    PCOS (polycystic ovarian syndrome)      Past Surgical History:   Procedure Laterality Date    US PYLORUS PEDIATRIC       No family history on file.    Time spent:  Chart review/prechartin min prior to day of service  Face-to-face visit:   54 min   Documentation:  16 min   Total time spent on day of service: 70 min    Mahogany Rodriguez, KRISH, CNM, IBCLC     no

## 2024-11-07 NOTE — DISCHARGE NOTE OB - ADMISSION DATE +STARTOFVISITDATE
POD1 s/p 1'  section, doing well  #SEVERE preE, BP well controlled on procardia 30xL, s/p MgSO4  -Routine postoperative care   -H/H appropriate   -Reg diet   -DC supriya Roman MD Statement Selected

## 2025-06-09 NOTE — OB PROVIDER H&P - NS_GESTAGE_OBGYN_ALL_OB_FT
Discharge instructions were given to the patient by loan mahoney.     The patient left the Emergency Department alert and oriented and in no acute distress with 3 prescriptions. The patient was encouraged to call or return to the ED for worsening issues or problems and was encouraged to schedule a follow up appointment for continuing care.     Ambulation assessment completed before discharge.  Pt left Emergency Department ambulating at baseline with no ortho devices  Ortho device education: none    The patient verbalized understanding of discharge instructions and prescriptions, all questions were answered. The patient has no further concerns at this time.     
See triage note.  Pt is alert and oriented x 4, RR even and unlabored, skin is warm and dry. Assesment completed and pt updated on plan of care.       Emergency Department Nursing Plan of Care       The Nursing Plan of Care is developed from the Nursing assessment and Emergency Department Attending provider initial evaluation.  The plan of care may be reviewed in the “ED Provider note”.    The Plan of Care was developed with the following considerations:   Patient / Family readiness to learn indicated by:verbalized understanding  Persons(s) to be included in education: patient  Barriers to Learning/Limitations:None    Signed     Santos Langley RN    6/9/2025   12:53 AM   
39w2d
